# Patient Record
Sex: FEMALE | Race: WHITE | NOT HISPANIC OR LATINO | URBAN - METROPOLITAN AREA
[De-identification: names, ages, dates, MRNs, and addresses within clinical notes are randomized per-mention and may not be internally consistent; named-entity substitution may affect disease eponyms.]

---

## 2017-09-21 ENCOUNTER — OFFICE (AMBULATORY)
Dept: URBAN - METROPOLITAN AREA CLINIC 64 | Facility: CLINIC | Age: 47
End: 2017-09-21
Payer: COMMERCIAL

## 2017-09-21 ENCOUNTER — ON CAMPUS - OUTPATIENT (AMBULATORY)
Dept: URBAN - METROPOLITAN AREA HOSPITAL 2 | Facility: HOSPITAL | Age: 47
End: 2017-09-21

## 2017-09-21 VITALS
DIASTOLIC BLOOD PRESSURE: 69 MMHG | RESPIRATION RATE: 18 BRPM | SYSTOLIC BLOOD PRESSURE: 107 MMHG | OXYGEN SATURATION: 97 % | SYSTOLIC BLOOD PRESSURE: 98 MMHG | SYSTOLIC BLOOD PRESSURE: 94 MMHG | SYSTOLIC BLOOD PRESSURE: 103 MMHG | DIASTOLIC BLOOD PRESSURE: 90 MMHG | HEART RATE: 76 BPM | TEMPERATURE: 98.4 F | HEART RATE: 81 BPM | DIASTOLIC BLOOD PRESSURE: 56 MMHG | HEART RATE: 77 BPM | SYSTOLIC BLOOD PRESSURE: 150 MMHG | SYSTOLIC BLOOD PRESSURE: 110 MMHG | SYSTOLIC BLOOD PRESSURE: 100 MMHG | DIASTOLIC BLOOD PRESSURE: 66 MMHG | HEART RATE: 85 BPM | HEART RATE: 86 BPM | HEART RATE: 84 BPM | OXYGEN SATURATION: 93 % | SYSTOLIC BLOOD PRESSURE: 135 MMHG | RESPIRATION RATE: 16 BRPM | HEIGHT: 67 IN | DIASTOLIC BLOOD PRESSURE: 88 MMHG | DIASTOLIC BLOOD PRESSURE: 46 MMHG | DIASTOLIC BLOOD PRESSURE: 70 MMHG | RESPIRATION RATE: 17 BRPM | OXYGEN SATURATION: 96 % | OXYGEN SATURATION: 94 % | SYSTOLIC BLOOD PRESSURE: 122 MMHG | DIASTOLIC BLOOD PRESSURE: 62 MMHG | DIASTOLIC BLOOD PRESSURE: 49 MMHG | HEART RATE: 87 BPM | WEIGHT: 200 LBS | OXYGEN SATURATION: 92 %

## 2017-09-21 DIAGNOSIS — K62.1 RECTAL POLYP: ICD-10-CM

## 2017-09-21 DIAGNOSIS — K29.60 OTHER GASTRITIS WITHOUT BLEEDING: ICD-10-CM

## 2017-09-21 DIAGNOSIS — D50.9 IRON DEFICIENCY ANEMIA, UNSPECIFIED: ICD-10-CM

## 2017-09-21 DIAGNOSIS — K29.40 CHRONIC ATROPHIC GASTRITIS WITHOUT BLEEDING: ICD-10-CM

## 2017-09-21 DIAGNOSIS — K44.9 DIAPHRAGMATIC HERNIA WITHOUT OBSTRUCTION OR GANGRENE: ICD-10-CM

## 2017-09-21 DIAGNOSIS — K64.0 FIRST DEGREE HEMORRHOIDS: ICD-10-CM

## 2017-09-21 DIAGNOSIS — K31.89 OTHER DISEASES OF STOMACH AND DUODENUM: ICD-10-CM

## 2017-09-21 LAB
GI HISTOLOGY: A. UNSPECIFIED: (no result)
GI HISTOLOGY: B. SELECT: (no result)
GI HISTOLOGY: C. UNSPECIFIED: (no result)
GI HISTOLOGY: D. UNSPECIFIED: (no result)
GI HISTOLOGY: E. UNSPECIFIED: (no result)
GI HISTOLOGY: PDF REPORT: (no result)

## 2017-09-21 PROCEDURE — 88305 TISSUE EXAM BY PATHOLOGIST: CPT | Performed by: INTERNAL MEDICINE

## 2017-09-21 PROCEDURE — 45380 COLONOSCOPY AND BIOPSY: CPT | Performed by: INTERNAL MEDICINE

## 2017-09-21 PROCEDURE — 43239 EGD BIOPSY SINGLE/MULTIPLE: CPT | Performed by: INTERNAL MEDICINE

## 2017-09-21 RX ADMIN — PROPOFOL: 10 INJECTION, EMULSION INTRAVENOUS at 13:14

## 2017-10-06 ENCOUNTER — OFFICE (AMBULATORY)
Dept: URBAN - METROPOLITAN AREA CLINIC 64 | Facility: CLINIC | Age: 47
End: 2017-10-06

## 2017-10-06 VITALS
WEIGHT: 205 LBS | HEART RATE: 88 BPM | DIASTOLIC BLOOD PRESSURE: 80 MMHG | SYSTOLIC BLOOD PRESSURE: 114 MMHG | HEIGHT: 67 IN

## 2017-10-06 DIAGNOSIS — D50.9 IRON DEFICIENCY ANEMIA, UNSPECIFIED: ICD-10-CM

## 2017-10-06 DIAGNOSIS — K59.1 FUNCTIONAL DIARRHEA: ICD-10-CM

## 2017-10-06 DIAGNOSIS — R68.81 EARLY SATIETY: ICD-10-CM

## 2017-10-06 DIAGNOSIS — K30 FUNCTIONAL DYSPEPSIA: ICD-10-CM

## 2017-10-06 PROCEDURE — 99213 OFFICE O/P EST LOW 20 MIN: CPT | Performed by: INTERNAL MEDICINE

## 2017-10-06 RX ORDER — AMOXICILLIN AND CLAVULANATE POTASSIUM 875; 125 MG/1; 1/1
1750 TABLET, FILM COATED ORAL
Qty: 14 | Refills: 0 | Status: COMPLETED
Start: 2017-10-06 | End: 2017-12-01

## 2017-12-01 ENCOUNTER — OFFICE (AMBULATORY)
Dept: URBAN - METROPOLITAN AREA CLINIC 64 | Facility: CLINIC | Age: 47
End: 2017-12-01

## 2017-12-01 VITALS
DIASTOLIC BLOOD PRESSURE: 81 MMHG | SYSTOLIC BLOOD PRESSURE: 150 MMHG | HEART RATE: 79 BPM | HEIGHT: 67 IN | WEIGHT: 206 LBS

## 2017-12-01 DIAGNOSIS — K29.40 CHRONIC ATROPHIC GASTRITIS WITHOUT BLEEDING: ICD-10-CM

## 2017-12-01 DIAGNOSIS — R74.8 ABNORMAL LEVELS OF OTHER SERUM ENZYMES: ICD-10-CM

## 2017-12-01 DIAGNOSIS — K31.84 GASTROPARESIS: ICD-10-CM

## 2017-12-01 DIAGNOSIS — D64.9 ANEMIA, UNSPECIFIED: ICD-10-CM

## 2017-12-01 DIAGNOSIS — K30 FUNCTIONAL DYSPEPSIA: ICD-10-CM

## 2017-12-01 PROCEDURE — 99213 OFFICE O/P EST LOW 20 MIN: CPT | Performed by: INTERNAL MEDICINE

## 2017-12-01 RX ORDER — METOCLOPRAMIDE HYDROCHLORIDE 10 MG/1
30 TABLET ORAL
Qty: 90 | Refills: 0 | Status: ACTIVE
Start: 2017-12-01

## 2017-12-31 ENCOUNTER — HOSPITAL ENCOUNTER (OUTPATIENT)
Dept: URGENT CARE | Facility: CLINIC | Age: 47
Setting detail: SPECIMEN
Discharge: HOME | End: 2017-12-31
Attending: FAMILY MEDICINE | Admitting: FAMILY MEDICINE

## 2017-12-31 LAB
BACTERIA SPEC AEROBE CULT: NORMAL
Lab: NORMAL
MICRO REPORT STATUS: NORMAL
SPECIMEN SOURCE: NORMAL

## 2018-01-16 ENCOUNTER — OFFICE (AMBULATORY)
Dept: URBAN - METROPOLITAN AREA CLINIC 64 | Facility: CLINIC | Age: 48
End: 2018-01-16

## 2018-01-16 VITALS
HEIGHT: 67 IN | HEART RATE: 80 BPM | SYSTOLIC BLOOD PRESSURE: 129 MMHG | DIASTOLIC BLOOD PRESSURE: 79 MMHG | WEIGHT: 201 LBS

## 2018-01-16 DIAGNOSIS — K29.40 CHRONIC ATROPHIC GASTRITIS WITHOUT BLEEDING: ICD-10-CM

## 2018-01-16 DIAGNOSIS — R74.8 ABNORMAL LEVELS OF OTHER SERUM ENZYMES: ICD-10-CM

## 2018-01-16 DIAGNOSIS — K31.84 GASTROPARESIS: ICD-10-CM

## 2018-01-16 PROCEDURE — 99214 OFFICE O/P EST MOD 30 MIN: CPT | Performed by: INTERNAL MEDICINE

## 2018-09-12 ENCOUNTER — OFFICE (AMBULATORY)
Dept: URBAN - METROPOLITAN AREA CLINIC 64 | Facility: CLINIC | Age: 48
End: 2018-09-12

## 2018-09-12 VITALS
SYSTOLIC BLOOD PRESSURE: 117 MMHG | HEART RATE: 84 BPM | DIASTOLIC BLOOD PRESSURE: 66 MMHG | WEIGHT: 201 LBS | HEIGHT: 67 IN

## 2018-09-12 DIAGNOSIS — K76.0 FATTY (CHANGE OF) LIVER, NOT ELSEWHERE CLASSIFIED: ICD-10-CM

## 2018-09-12 DIAGNOSIS — K31.84 GASTROPARESIS: ICD-10-CM

## 2018-09-12 PROCEDURE — 99214 OFFICE O/P EST MOD 30 MIN: CPT | Performed by: INTERNAL MEDICINE

## 2021-01-11 ENCOUNTER — TELEPHONE (OUTPATIENT)
Dept: FAMILY MEDICINE CLINIC | Facility: CLINIC | Age: 51
End: 2021-01-11

## 2021-01-11 ENCOUNTER — LAB (OUTPATIENT)
Dept: LAB | Facility: HOSPITAL | Age: 51
End: 2021-01-11

## 2021-01-11 ENCOUNTER — OFFICE VISIT (OUTPATIENT)
Dept: FAMILY MEDICINE CLINIC | Facility: CLINIC | Age: 51
End: 2021-01-11

## 2021-01-11 VITALS
RESPIRATION RATE: 16 BRPM | OXYGEN SATURATION: 98 % | BODY MASS INDEX: 34.69 KG/M2 | WEIGHT: 221 LBS | SYSTOLIC BLOOD PRESSURE: 138 MMHG | TEMPERATURE: 97.1 F | HEART RATE: 83 BPM | HEIGHT: 67 IN | DIASTOLIC BLOOD PRESSURE: 79 MMHG

## 2021-01-11 DIAGNOSIS — Z00.00 PREVENTATIVE HEALTH CARE: ICD-10-CM

## 2021-01-11 DIAGNOSIS — E53.8 B12 DEFICIENCY: ICD-10-CM

## 2021-01-11 DIAGNOSIS — N25.89 RENAL TUBULAR ACIDOSIS: ICD-10-CM

## 2021-01-11 DIAGNOSIS — L40.9 PSORIASIS: ICD-10-CM

## 2021-01-11 DIAGNOSIS — R22.1 MASS IN NECK: Primary | ICD-10-CM

## 2021-01-11 DIAGNOSIS — E55.9 VITAMIN D DEFICIENCY: ICD-10-CM

## 2021-01-11 DIAGNOSIS — F17.200 TOBACCO USE DISORDER: ICD-10-CM

## 2021-01-11 PROBLEM — D75.839 THROMBOCYTOSIS: Status: ACTIVE | Noted: 2017-06-21

## 2021-01-11 LAB
ALBUMIN SERPL-MCNC: 3.9 G/DL (ref 3.5–5.2)
ALBUMIN/GLOB SERPL: 1.3 G/DL
ALP SERPL-CCNC: 128 U/L (ref 39–117)
ALT SERPL W P-5'-P-CCNC: 35 U/L (ref 1–33)
ANION GAP SERPL CALCULATED.3IONS-SCNC: 8.6 MMOL/L (ref 5–15)
AST SERPL-CCNC: 26 U/L (ref 1–32)
BACTERIA UR QL AUTO: ABNORMAL /HPF
BASOPHILS # BLD AUTO: 0.08 10*3/MM3 (ref 0–0.2)
BASOPHILS NFR BLD AUTO: 0.6 % (ref 0–1.5)
BILIRUB SERPL-MCNC: 0.3 MG/DL (ref 0–1.2)
BILIRUB UR QL STRIP: NEGATIVE
BUN SERPL-MCNC: 11 MG/DL (ref 6–20)
BUN/CREAT SERPL: 15.5 (ref 7–25)
CALCIUM SPEC-SCNC: 9.1 MG/DL (ref 8.6–10.5)
CHLORIDE SERPL-SCNC: 103 MMOL/L (ref 98–107)
CHOLEST SERPL-MCNC: 169 MG/DL (ref 0–200)
CLARITY UR: CLEAR
CO2 SERPL-SCNC: 25.4 MMOL/L (ref 22–29)
COLOR UR: YELLOW
CREAT SERPL-MCNC: 0.71 MG/DL (ref 0.57–1)
DEPRECATED RDW RBC AUTO: 42.2 FL (ref 37–54)
EOSINOPHIL # BLD AUTO: 0.24 10*3/MM3 (ref 0–0.4)
EOSINOPHIL NFR BLD AUTO: 1.8 % (ref 0.3–6.2)
ERYTHROCYTE [DISTWIDTH] IN BLOOD BY AUTOMATED COUNT: 13.5 % (ref 12.3–15.4)
GFR SERPL CREATININE-BSD FRML MDRD: 87 ML/MIN/1.73
GLOBULIN UR ELPH-MCNC: 3 GM/DL
GLUCOSE SERPL-MCNC: 106 MG/DL (ref 65–99)
GLUCOSE UR STRIP-MCNC: NEGATIVE MG/DL
HCT VFR BLD AUTO: 48.8 % (ref 34–46.6)
HCV AB SER DONR QL: NORMAL
HDLC SERPL-MCNC: 39 MG/DL (ref 40–60)
HGB BLD-MCNC: 15.6 G/DL (ref 12–15.9)
HGB UR QL STRIP.AUTO: ABNORMAL
HYALINE CASTS UR QL AUTO: ABNORMAL /LPF
IMM GRANULOCYTES # BLD AUTO: 0.08 10*3/MM3 (ref 0–0.05)
IMM GRANULOCYTES NFR BLD AUTO: 0.6 % (ref 0–0.5)
KETONES UR QL STRIP: NEGATIVE
LDLC SERPL CALC-MCNC: 105 MG/DL (ref 0–100)
LDLC/HDLC SERPL: 2.61 {RATIO}
LEUKOCYTE ESTERASE UR QL STRIP.AUTO: ABNORMAL
LYMPHOCYTES # BLD AUTO: 3.01 10*3/MM3 (ref 0.7–3.1)
LYMPHOCYTES NFR BLD AUTO: 22.6 % (ref 19.6–45.3)
MCH RBC QN AUTO: 27.7 PG (ref 26.6–33)
MCHC RBC AUTO-ENTMCNC: 32 G/DL (ref 31.5–35.7)
MCV RBC AUTO: 86.7 FL (ref 79–97)
MONOCYTES # BLD AUTO: 0.95 10*3/MM3 (ref 0.1–0.9)
MONOCYTES NFR BLD AUTO: 7.1 % (ref 5–12)
NEUTROPHILS NFR BLD AUTO: 67.3 % (ref 42.7–76)
NEUTROPHILS NFR BLD AUTO: 8.97 10*3/MM3 (ref 1.7–7)
NITRITE UR QL STRIP: NEGATIVE
NRBC BLD AUTO-RTO: 0 /100 WBC (ref 0–0.2)
PH UR STRIP.AUTO: 7 [PH] (ref 5–8)
PLATELET # BLD AUTO: 378 10*3/MM3 (ref 140–450)
PMV BLD AUTO: 10.1 FL (ref 6–12)
POTASSIUM SERPL-SCNC: 3.6 MMOL/L (ref 3.5–5.2)
PROT SERPL-MCNC: 6.9 G/DL (ref 6–8.5)
PROT UR QL STRIP: ABNORMAL
RBC # BLD AUTO: 5.63 10*6/MM3 (ref 3.77–5.28)
RBC # UR: ABNORMAL /HPF
REF LAB TEST METHOD: ABNORMAL
SODIUM SERPL-SCNC: 137 MMOL/L (ref 136–145)
SP GR UR STRIP: 1.01 (ref 1–1.03)
SQUAMOUS #/AREA URNS HPF: ABNORMAL /HPF
TRIGL SERPL-MCNC: 141 MG/DL (ref 0–150)
TSH SERPL DL<=0.05 MIU/L-ACNC: 1.43 UIU/ML (ref 0.27–4.2)
UROBILINOGEN UR QL STRIP: ABNORMAL
VLDLC SERPL-MCNC: 25 MG/DL (ref 5–40)
WBC # BLD AUTO: 13.33 10*3/MM3 (ref 3.4–10.8)
WBC UR QL AUTO: ABNORMAL /HPF

## 2021-01-11 PROCEDURE — 81001 URINALYSIS AUTO W/SCOPE: CPT | Performed by: FAMILY MEDICINE

## 2021-01-11 PROCEDURE — 87086 URINE CULTURE/COLONY COUNT: CPT | Performed by: FAMILY MEDICINE

## 2021-01-11 PROCEDURE — 99204 OFFICE O/P NEW MOD 45 MIN: CPT | Performed by: FAMILY MEDICINE

## 2021-01-11 PROCEDURE — 80061 LIPID PANEL: CPT | Performed by: FAMILY MEDICINE

## 2021-01-11 PROCEDURE — 96372 THER/PROPH/DIAG INJ SC/IM: CPT | Performed by: FAMILY MEDICINE

## 2021-01-11 PROCEDURE — 85025 COMPLETE CBC W/AUTO DIFF WBC: CPT | Performed by: FAMILY MEDICINE

## 2021-01-11 PROCEDURE — 80053 COMPREHEN METABOLIC PANEL: CPT | Performed by: FAMILY MEDICINE

## 2021-01-11 PROCEDURE — 86803 HEPATITIS C AB TEST: CPT | Performed by: FAMILY MEDICINE

## 2021-01-11 PROCEDURE — 36415 COLL VENOUS BLD VENIPUNCTURE: CPT | Performed by: FAMILY MEDICINE

## 2021-01-11 PROCEDURE — 84443 ASSAY THYROID STIM HORMONE: CPT | Performed by: FAMILY MEDICINE

## 2021-01-11 RX ORDER — SPIRONOLACTONE 25 MG/1
25 TABLET ORAL DAILY
Qty: 90 TABLET | Refills: 1 | Status: SHIPPED | OUTPATIENT
Start: 2021-01-11

## 2021-01-11 RX ORDER — ALBUTEROL SULFATE 90 UG/1
AEROSOL, METERED RESPIRATORY (INHALATION)
COMMUNITY
Start: 2017-07-02 | End: 2022-03-11

## 2021-01-11 RX ORDER — POTASSIUM CHLORIDE 20 MEQ/1
TABLET, EXTENDED RELEASE ORAL
COMMUNITY
Start: 2012-12-03

## 2021-01-11 RX ORDER — SPIRONOLACTONE 25 MG/1
TABLET ORAL
COMMUNITY
Start: 2017-07-02 | End: 2021-01-11 | Stop reason: SDUPTHER

## 2021-01-11 RX ORDER — CYANOCOBALAMIN 1000 UG/ML
1000 INJECTION, SOLUTION INTRAMUSCULAR; SUBCUTANEOUS
Status: DISCONTINUED | OUTPATIENT
Start: 2021-01-11 | End: 2021-04-19

## 2021-01-11 RX ORDER — BUPROPION HYDROCHLORIDE 150 MG/1
TABLET ORAL
COMMUNITY
Start: 2017-07-02 | End: 2021-01-11

## 2021-01-11 RX ORDER — CLOBETASOL PROPIONATE 0.5 MG/G
OINTMENT TOPICAL
COMMUNITY
Start: 2020-10-23

## 2021-01-11 RX ADMIN — CYANOCOBALAMIN 1000 MCG: 1000 INJECTION, SOLUTION INTRAMUSCULAR; SUBCUTANEOUS at 11:58

## 2021-01-12 LAB — BACTERIA SPEC AEROBE CULT: NO GROWTH

## 2021-01-12 NOTE — TELEPHONE ENCOUNTER
Xena, please call [facility] for her most recent mammogram and call [facility] for her most recent colonoscopy. Thank you.

## 2021-01-12 NOTE — PROGRESS NOTES
Subjective   Chief Complaint   Patient presents with   • get established   • Discussed medical problems and medications   • Lab review and lab order   • Lump on the neck     Claire Moran is a 50 y.o. female.     Patient Care Team:  Ophelia Benson MD as PCP - General (Family Medicine)  Quinn Allen MD as Consulting Physician (Nephrology)    She is coming in today to get established and she also wants to review her medical problems and her medications and also address a new problem.  She is currently being followed and treated by nephrologist due to renal tubular acidosis, which she was diagnosed with as a young adult.  She needs some refills today from me.  She is currently on spironolactone.  She is also being treated for psoriasis and she use it clobetasol as needed.  She mainly has outbreaks on her elbows.  She is a smoker, however she has tried to quit in the past few times even with the help of Wellbutrin and she was not successful much.  She tells me that she has persistently slightly elevated white count and this was evaluated in the past.  She also wants to talk about the lump, which she noted on the right side of her neck about a month ago.  At the beginning it seemed to be fluctuated in size, but lately it seems to be getting bigger.  She denies any discomfort and she denies any other lumps.  She denies any cough, congestion, difficulty breathing, night sweats, or any skin issues.       The following portions of the patient's history were reviewed and updated as appropriate: allergies, current medications, past family history, past medical history, past social history, past surgical history and problem list.  Past Medical History:   Diagnosis Date   • Allergic rhinitis    • Anemia    • Kidney stone    • Psoriasis    • RTA (renal tubular acidosis)      Past Surgical History:   Procedure Laterality Date   • DILATATION AND CURETTAGE     • FOOT SURGERY       The patient has a family history of  Family  "History   Adopted: Yes   Family history unknown: Yes     Social History     Socioeconomic History   • Marital status:      Spouse name: Not on file   • Number of children: Not on file   • Years of education: Not on file   • Highest education level: Not on file   Tobacco Use   • Smoking status: Current Every Day Smoker     Packs/day: 0.50   • Smokeless tobacco: Never Used   Substance and Sexual Activity   • Alcohol use: Yes   • Drug use: Never   • Sexual activity: Defer       Review of Systems   Constitutional: Negative for activity change, fatigue and fever.   Respiratory: Negative for cough, shortness of breath and wheezing.    Cardiovascular: Negative for chest pain, palpitations and leg swelling.   Gastrointestinal: Negative for constipation, diarrhea and indigestion.   Skin: Negative for color change, dry skin and rash.   Neurological: Negative for tremors and headache.   Hematological: Positive for adenopathy.     Visit Vitals  /79 (BP Location: Left arm, Patient Position: Sitting, Cuff Size: Large Adult)   Pulse 83   Temp 97.1 °F (36.2 °C) (Temporal)   Resp 16   Ht 170.2 cm (67\")   Wt 100 kg (221 lb)   SpO2 98%   BMI 34.61 kg/m²       Current Outpatient Medications:   •  albuterol sulfate HFA (ProAir HFA) 108 (90 Base) MCG/ACT inhaler, PROAIR  (90 Base) MCG/ACT AERS, Disp: , Rfl:   •  potassium chloride (KLOR-CON) 20 MEQ CR tablet, KLOR-CON M20 20 MEQ CR-TABS, Disp: , Rfl:   •  spironolactone (ALDACTONE) 25 MG tablet, Take 1 tablet by mouth Daily., Disp: 90 tablet, Rfl: 1  •  clobetasol (TEMOVATE) 0.05 % ointment, , Disp: , Rfl:     Current Facility-Administered Medications:   •  cyanocobalamin injection 1,000 mcg, 1,000 mcg, Intramuscular, Q28 Days, Ophelia Benson MD, 1,000 mcg at 01/11/21 1158    Objective   Physical Exam  Vitals signs and nursing note reviewed.   Constitutional:       Appearance: Normal appearance. She is well-developed.   HENT:      Head: Normocephalic and " atraumatic.   Neck:      Comments: There is a lump palpated on the right side of the neck just below the jaw, it is about 3 cm in diameter, is not very mobile, no palpation tenderness.  No skin associated changes.  Cardiovascular:      Rate and Rhythm: Normal rate and regular rhythm.      Heart sounds: Normal heart sounds. No murmur. No gallop.    Pulmonary:      Effort: Pulmonary effort is normal. No respiratory distress.      Breath sounds: Normal breath sounds. No wheezing, rhonchi or rales.   Chest:      Chest wall: No tenderness.   Neurological:      General: No focal deficit present.      Mental Status: She is alert and oriented to person, place, and time. Mental status is at baseline.              Office Visit on 01/11/2021   Component Date Value Ref Range Status   • WBC 01/11/2021 13.33* 3.40 - 10.80 10*3/mm3 Final   • RBC 01/11/2021 5.63* 3.77 - 5.28 10*6/mm3 Final   • Hemoglobin 01/11/2021 15.6  12.0 - 15.9 g/dL Final   • Hematocrit 01/11/2021 48.8* 34.0 - 46.6 % Final   • MCV 01/11/2021 86.7  79.0 - 97.0 fL Final   • MCH 01/11/2021 27.7  26.6 - 33.0 pg Final   • MCHC 01/11/2021 32.0  31.5 - 35.7 g/dL Final   • RDW 01/11/2021 13.5  12.3 - 15.4 % Final   • RDW-SD 01/11/2021 42.2  37.0 - 54.0 fl Final   • MPV 01/11/2021 10.1  6.0 - 12.0 fL Final   • Platelets 01/11/2021 378  140 - 450 10*3/mm3 Final   • Neutrophil % 01/11/2021 67.3  42.7 - 76.0 % Final   • Lymphocyte % 01/11/2021 22.6  19.6 - 45.3 % Final   • Monocyte % 01/11/2021 7.1  5.0 - 12.0 % Final   • Eosinophil % 01/11/2021 1.8  0.3 - 6.2 % Final   • Basophil % 01/11/2021 0.6  0.0 - 1.5 % Final   • Immature Grans % 01/11/2021 0.6* 0.0 - 0.5 % Final   • Neutrophils, Absolute 01/11/2021 8.97* 1.70 - 7.00 10*3/mm3 Final   • Lymphocytes, Absolute 01/11/2021 3.01  0.70 - 3.10 10*3/mm3 Final   • Monocytes, Absolute 01/11/2021 0.95* 0.10 - 0.90 10*3/mm3 Final   • Eosinophils, Absolute 01/11/2021 0.24  0.00 - 0.40 10*3/mm3 Final   • Basophils, Absolute  01/11/2021 0.08  0.00 - 0.20 10*3/mm3 Final   • Immature Grans, Absolute 01/11/2021 0.08* 0.00 - 0.05 10*3/mm3 Final   • nRBC 01/11/2021 0.0  0.0 - 0.2 /100 WBC Final   • Glucose 01/11/2021 106* 65 - 99 mg/dL Final   • BUN 01/11/2021 11  6 - 20 mg/dL Final   • Creatinine 01/11/2021 0.71  0.57 - 1.00 mg/dL Final   • Sodium 01/11/2021 137  136 - 145 mmol/L Final   • Potassium 01/11/2021 3.6  3.5 - 5.2 mmol/L Final   • Chloride 01/11/2021 103  98 - 107 mmol/L Final   • CO2 01/11/2021 25.4  22.0 - 29.0 mmol/L Final   • Calcium 01/11/2021 9.1  8.6 - 10.5 mg/dL Final   • Total Protein 01/11/2021 6.9  6.0 - 8.5 g/dL Final   • Albumin 01/11/2021 3.90  3.50 - 5.20 g/dL Final   • ALT (SGPT) 01/11/2021 35* 1 - 33 U/L Final   • AST (SGOT) 01/11/2021 26  1 - 32 U/L Final   • Alkaline Phosphatase 01/11/2021 128* 39 - 117 U/L Final   • Total Bilirubin 01/11/2021 0.3  0.0 - 1.2 mg/dL Final   • eGFR Non  Amer 01/11/2021 87  >60 mL/min/1.73 Final   • Globulin 01/11/2021 3.0  gm/dL Final   • A/G Ratio 01/11/2021 1.3  g/dL Final   • BUN/Creatinine Ratio 01/11/2021 15.5  7.0 - 25.0 Final   • Anion Gap 01/11/2021 8.6  5.0 - 15.0 mmol/L Final   • Total Cholesterol 01/11/2021 169  0 - 200 mg/dL Final   • Triglycerides 01/11/2021 141  0 - 150 mg/dL Final   • HDL Cholesterol 01/11/2021 39* 40 - 60 mg/dL Final   • LDL Cholesterol  01/11/2021 105* 0 - 100 mg/dL Final   • VLDL Cholesterol 01/11/2021 25  5 - 40 mg/dL Final   • LDL/HDL Ratio 01/11/2021 2.61   Final   • TSH 01/11/2021 1.430  0.270 - 4.200 uIU/mL Final   • Color, UA 01/11/2021 Yellow  Yellow, Straw Final   • Appearance, UA 01/11/2021 Clear  Clear Final   • pH, UA 01/11/2021 7.0  5.0 - 8.0 Final   • Specific Gravity, UA 01/11/2021 1.012  1.005 - 1.030 Final   • Glucose, UA 01/11/2021 Negative  Negative Final   • Ketones, UA 01/11/2021 Negative  Negative Final   • Bilirubin, UA 01/11/2021 Negative  Negative Final   • Blood, UA 01/11/2021 Small (1+)* Negative Final   • Protein,  UA 01/11/2021 Trace* Negative Final   • Leuk Esterase, UA 01/11/2021 Small (1+)* Negative Final   • Nitrite, UA 01/11/2021 Negative  Negative Final   • Urobilinogen, UA 01/11/2021 0.2 E.U./dL  0.2 - 1.0 E.U./dL Final   • Hepatitis C Ab 01/11/2021 Non-Reactive  Non-Reactive Final   • RBC, UA 01/11/2021 31-50* None Seen, 0-2 /HPF Final   • WBC, UA 01/11/2021 6-12* None Seen, 0-2 /HPF Final   • Bacteria, UA 01/11/2021 1+* None Seen /HPF Final   • Squamous Epithelial Cells, UA 01/11/2021 3-6* None Seen, 0-2 /HPF Final   • Hyaline Casts, UA 01/11/2021 0-2  None Seen /LPF Final   • Methodology 01/11/2021 Automated Microscopy   Final       FOLLOWING LABS WERE REVIEWED TODAY:  CMP    CMP 1/11/21   BUN 11   Creatinine 0.71   eGFR Non African Am 87   Sodium 137   Potassium 3.6   Chloride 103   Calcium 9.1   Albumin 3.90   Total Bilirubin 0.3   Alkaline Phosphatase 128 (A)   AST (SGOT) 26   ALT (SGPT) 35 (A)   (A) Abnormal value            CBC    CBC 1/11/21   WBC 13.33 (A)   RBC 5.63 (A)   Hemoglobin 15.6   Hematocrit 48.8 (A)   MCV 86.7   MCH 27.7   MCHC 32.0   RDW 13.5   Platelets 378   (A) Abnormal value                  Lab Results   Component Value Date    BUN 11 01/11/2021    CREATININE 0.71 01/11/2021    EGFRIFNONA 87 01/11/2021     01/11/2021    K 3.6 01/11/2021     01/11/2021    CALCIUM 9.1 01/11/2021    ALBUMIN 3.90 01/11/2021    BILITOT 0.3 01/11/2021    ALKPHOS 128 (H) 01/11/2021    AST 26 01/11/2021    ALT 35 (H) 01/11/2021    TRIG 141 01/11/2021    HDL 39 (L) 01/11/2021    VLDL 25 01/11/2021     (H) 01/11/2021    LDLHDL 2.61 01/11/2021    WBC 13.33 (H) 01/11/2021    RBC 5.63 (H) 01/11/2021    HCT 48.8 (H) 01/11/2021    MCV 86.7 01/11/2021    MCH 27.7 01/11/2021    TSH 1.430 01/11/2021          Assessment/Plan   Diagnoses and all orders for this visit:    1. Mass in neck (Primary)  -     US Head Neck Soft Tissue; Future    2. Renal tubular acidosis    3. Preventative health care  -     CBC Auto  Differential  -     Comprehensive Metabolic Panel  -     Lipid Panel  -     TSH  -     Urinalysis With Culture If Indicated - Urine, Clean Catch  -     Hepatitis C Antibody  -     Urinalysis, Microscopic Only - Urine, Clean Catch  -     Urine Culture - Urine, Urine, Clean Catch    4. B12 deficiency  -     cyanocobalamin injection 1,000 mcg    5. Vitamin D deficiency    6. Psoriasis    7. Tobacco use disorder    Other orders  -     spironolactone (ALDACTONE) 25 MG tablet; Take 1 tablet by mouth Daily.  Dispense: 90 tablet; Refill: 1    I reviewed her medical problems and her medications.  I also reviewed her available lab results including CBC and CMP.  Her white count was elevated at around 13 and she tells me that this is a chronic finding which she was evaluated for in the past.  I refilled her spironolactone and she was given vitamin B12 shots due to known vitamin B12 deficiency.  She will also continue vitamin D supplementation.  She may continue clobetasol for psoriasis.  Smoking cessation was also discussed.  If it comes to a lump on the right side of the neck this will need certainly further evaluation including blood work and CBC.  I will be getting ultrasound of that area.  I explained to her that she might need further testing like CAT scan, however further recommendations will follow once I have the results of the ultrasound.  All questions were answered today to my best knowledge and her satisfaction.  I will also be getting the copy of her most recent mammogram from June 2020 and her colonoscopy report from few years ago.               Return in about 6 months (around 7/11/2021) for Annual physical.    Requested Prescriptions     Signed Prescriptions Disp Refills   • spironolactone (ALDACTONE) 25 MG tablet 90 tablet 1     Sig: Take 1 tablet by mouth Daily.

## 2021-01-19 DIAGNOSIS — R22.1 MASS IN NECK: Primary | ICD-10-CM

## 2021-01-29 ENCOUNTER — OFFICE VISIT (OUTPATIENT)
Dept: FAMILY MEDICINE CLINIC | Facility: CLINIC | Age: 51
End: 2021-01-29

## 2021-01-29 VITALS
SYSTOLIC BLOOD PRESSURE: 139 MMHG | RESPIRATION RATE: 16 BRPM | HEART RATE: 97 BPM | DIASTOLIC BLOOD PRESSURE: 84 MMHG | WEIGHT: 221 LBS | BODY MASS INDEX: 34.69 KG/M2 | OXYGEN SATURATION: 96 % | TEMPERATURE: 97.1 F | HEIGHT: 67 IN

## 2021-01-29 DIAGNOSIS — D72.829 LEUKOCYTOSIS, UNSPECIFIED TYPE: ICD-10-CM

## 2021-01-29 DIAGNOSIS — F17.200 TOBACCO USE DISORDER: ICD-10-CM

## 2021-01-29 DIAGNOSIS — R59.0 LYMPHADENOPATHY OF RIGHT CERVICAL REGION: Primary | ICD-10-CM

## 2021-01-29 PROCEDURE — 99214 OFFICE O/P EST MOD 30 MIN: CPT | Performed by: FAMILY MEDICINE

## 2021-01-30 PROBLEM — D72.829 LEUKOCYTOSIS: Status: ACTIVE | Noted: 2021-01-30

## 2021-01-30 NOTE — PROGRESS NOTES
Subjective   Chief Complaint   Patient presents with   • Discuss Results   • Cervical lymphadenopathy     Claire Moran is a 50 y.o. female.     Patient Care Team:  Ophelia Benson MD as PCP - General (Family Medicine)  Quinn Allen MD as Consulting Physician (Nephrology)    She is coming in today to follow-up on her neck mass and discuss her recent testing.  She for the first time noticed a lump on the right side of her neck about 6 weeks ago.  She was evaluated here couple weeks ago and had blood work as well as the ultrasound done.  Ultrasound looks suspicious for a necrotic lymph node in CT of the neck was ordered in this is suspicious for possible metastasis.  She denies any other lumps or any other symptoms including night sweats, fatigue, weight loss, vomiting, abdominal pain,.  She is up to speed with her mammogram.  Her recent labs showed slightly elevated white count, however this is not new and she has had per her history elevated white count for quite some time.       The following portions of the patient's history were reviewed and updated as appropriate: allergies, current medications, past family history, past medical history, past social history, past surgical history and problem list.  Past Medical History:   Diagnosis Date   • Allergic rhinitis    • Anemia    • Kidney stone    • Psoriasis    • RTA (renal tubular acidosis)      Past Surgical History:   Procedure Laterality Date   • DILATATION AND CURETTAGE     • FOOT SURGERY       The patient has a family history of  Family History   Adopted: Yes   Family history unknown: Yes     Social History     Socioeconomic History   • Marital status:      Spouse name: Not on file   • Number of children: Not on file   • Years of education: Not on file   • Highest education level: Not on file   Tobacco Use   • Smoking status: Current Every Day Smoker     Packs/day: 0.50   • Smokeless tobacco: Never Used   Substance and Sexual Activity   • Alcohol use:  "Yes   • Drug use: Never   • Sexual activity: Defer       Review of Systems   Constitutional: Negative for activity change, fatigue and fever.   Respiratory: Negative for cough, shortness of breath and wheezing.    Cardiovascular: Negative for chest pain, palpitations and leg swelling.   Gastrointestinal: Negative for constipation, diarrhea and indigestion.   Skin: Negative for color change, dry skin and rash.   Neurological: Negative for tremors and headache.   Hematological: Positive for adenopathy.     Visit Vitals  /84 (BP Location: Left arm, Patient Position: Sitting, Cuff Size: Large Adult)   Pulse 97   Temp 97.1 °F (36.2 °C) (Temporal)   Resp 16   Ht 170.2 cm (67\")   Wt 100 kg (221 lb)   SpO2 96%   BMI 34.61 kg/m²       Current Outpatient Medications:   •  albuterol sulfate HFA (ProAir HFA) 108 (90 Base) MCG/ACT inhaler, PROAIR  (90 Base) MCG/ACT AERS, Disp: , Rfl:   •  clobetasol (TEMOVATE) 0.05 % ointment, , Disp: , Rfl:   •  potassium chloride (KLOR-CON) 20 MEQ CR tablet, KLOR-CON M20 20 MEQ CR-TABS, Disp: , Rfl:   •  spironolactone (ALDACTONE) 25 MG tablet, Take 1 tablet by mouth Daily., Disp: 90 tablet, Rfl: 1    Current Facility-Administered Medications:   •  cyanocobalamin injection 1,000 mcg, 1,000 mcg, Intramuscular, Q28 Days, Ophelia Benson MD, 1,000 mcg at 01/11/21 1158    Objective   Physical Exam  Constitutional:       General: She is not in acute distress.     Appearance: Normal appearance. She is well-developed. She is not ill-appearing or diaphoretic.      Comments: Patient is in no distress, patient has normal voice and speech.  Normal respiratory effort.   HENT:      Head: Normocephalic and atraumatic.   Neck:      Musculoskeletal: Normal range of motion and neck supple.      Comments: There is a mass noted on the right side of the neck just below the mandibular, it is about 3 cm in diameter, not mobile and nontender.  No skin associated changes.  Pulmonary:      Effort: " Pulmonary effort is normal.   Lymphadenopathy:      Cervical: Cervical adenopathy present.   Neurological:      General: No focal deficit present.      Mental Status: She is alert and oriented to person, place, and time. Mental status is at baseline.   Psychiatric:         Mood and Affect: Mood normal.       FOLLOWING LABS WERE REVIEWED TODAY:  CBC    CBC 1/11/21   WBC 13.33 (A)   RBC 5.63 (A)   Hemoglobin 15.6   Hematocrit 48.8 (A)   MCV 86.7   MCH 27.7   MCHC 32.0   RDW 13.5   Platelets 378   (A) Abnormal value            CMP    CMP 1/11/21   BUN 11   Creatinine 0.71   eGFR Non African Am 87   Sodium 137   Potassium 3.6   Chloride 103   Calcium 9.1   Albumin 3.90   Total Bilirubin 0.3   Alkaline Phosphatase 128 (A)   AST (SGOT) 26   ALT (SGPT) 35 (A)   (A) Abnormal value            TSH    TSH 6/17/20 1/11/21   TSH 1.730 1.430      Comments are available for some flowsheets but are not being displayed.             Assessment/Plan   Diagnoses and all orders for this visit:    1. Lymphadenopathy of right cervical region (Primary)  -     CT Chest Without Contrast; Future  -     CT Abdomen Pelvis With Contrast; Future  -     Ambulatory Referral to Hematology    2. Leukocytosis, unspecified type    3. Tobacco use disorder      I reviewed her recent labs as well as the ultrasound of the neck and CT of the neck.  The ultrasound of the neck and CT of the [facility] done at [facility] and these reports were scanned separately in the media section of patient's chart.  It shows a suspicious mass in the right upper part of the neck which is about 2.5 centimeters in diameter and show some necrotic features highly suspicious for metastatic disease.  She does not have any systemic symptoms and she is up to speed with her mammogram.  I will ordering CT of the chest and abdomen with contrast to look for any further lymphadenopathy and possible primary.  She will need a tissue biopsy as well and that can be either done  through the core biopsy or possibly lymph node removal.  I will be referring her to see a hematologist/oncologist, Dr. Benjamin.  In fact I contacted Dr. Benjamin today personally to discuss the case with him and he agrees with proceeding with the CTs and he will further guide the work-up and management of this patient.  I tried to answer all my questions today to my best knowledge and her satisfaction.  I do understand that information given today creates a lot of anxiety and stress and I was trying to be as informative and sensitive as possible today.        Return if symptoms worsen or fail to improve, for Next scheduled follow up.    Requested Prescriptions      No prescriptions requested or ordered in this encounter

## 2021-02-01 ENCOUNTER — TELEPHONE (OUTPATIENT)
Dept: ONCOLOGY | Facility: CLINIC | Age: 51
End: 2021-02-01

## 2021-02-02 ENCOUNTER — TELEPHONE (OUTPATIENT)
Dept: FAMILY MEDICINE CLINIC | Facility: CLINIC | Age: 51
End: 2021-02-02

## 2021-02-02 RX ORDER — SULFAMETHOXAZOLE AND TRIMETHOPRIM 800; 160 MG/1; MG/1
1 TABLET ORAL 2 TIMES DAILY
Qty: 10 TABLET | Refills: 0 | Status: SHIPPED | OUTPATIENT
Start: 2021-02-02 | End: 2021-02-07

## 2021-02-02 NOTE — TELEPHONE ENCOUNTER
Caller: Claire Moran    Relationship: Self    Best call back number: [phone]      PT STATED THAT SHE BELIEVES HER UTI IS RETURNING AS SHE IS HAVING FREQUENT URINATION WITH NO RELIEF OF OVER THE COUNTER.    Have you had these symptoms before:    [x] Yes  [] No    If a prescription is needed, what is your preferred pharmacy and phone number: [facility] [address] [phone] [facility] [phone]

## 2021-02-08 ENCOUNTER — TELEPHONE (OUTPATIENT)
Dept: ONCOLOGY | Facility: CLINIC | Age: 51
End: 2021-02-08

## 2021-02-08 ENCOUNTER — CONSULT (OUTPATIENT)
Dept: ONCOLOGY | Facility: CLINIC | Age: 51
End: 2021-02-08

## 2021-02-08 ENCOUNTER — LAB (OUTPATIENT)
Dept: LAB | Facility: HOSPITAL | Age: 51
End: 2021-02-08

## 2021-02-08 VITALS — BODY MASS INDEX: 35.03 KG/M2 | HEIGHT: 67 IN | WEIGHT: 223.2 LBS | TEMPERATURE: 97.9 F | RESPIRATION RATE: 18 BRPM

## 2021-02-08 DIAGNOSIS — R59.0 LYMPHADENOPATHY OF RIGHT CERVICAL REGION: ICD-10-CM

## 2021-02-08 DIAGNOSIS — R59.0 LYMPHADENOPATHY OF RIGHT CERVICAL REGION: Primary | ICD-10-CM

## 2021-02-08 LAB
BASOPHILS # BLD AUTO: 0.07 10*3/MM3 (ref 0–0.2)
BASOPHILS NFR BLD AUTO: 0.5 % (ref 0–1.5)
CORTIS SERPL-MCNC: 5.52 MCG/DL
CRP SERPL-MCNC: 1.19 MG/DL (ref 0–0.5)
DEPRECATED RDW RBC AUTO: 48.5 FL (ref 37–54)
EOSINOPHIL # BLD AUTO: 0.26 10*3/MM3 (ref 0–0.4)
EOSINOPHIL NFR BLD AUTO: 1.8 % (ref 0.3–6.2)
ERYTHROCYTE [DISTWIDTH] IN BLOOD BY AUTOMATED COUNT: 15.4 % (ref 12.3–15.4)
ERYTHROCYTE [SEDIMENTATION RATE] IN BLOOD: 25 MM/HR (ref 0–20)
HCT VFR BLD AUTO: 48.7 % (ref 34–46.6)
HGB BLD-MCNC: 15.5 G/DL (ref 12–15.9)
LDH SERPL-CCNC: 139 U/L (ref 135–214)
LYMPHOCYTES # BLD AUTO: 3.25 10*3/MM3 (ref 0.7–3.1)
LYMPHOCYTES NFR BLD AUTO: 22.3 % (ref 19.6–45.3)
MCH RBC QN AUTO: 27.6 PG (ref 26.6–33)
MCHC RBC AUTO-ENTMCNC: 31.8 G/DL (ref 31.5–35.7)
MCV RBC AUTO: 86.7 FL (ref 79–97)
MONOCYTES # BLD AUTO: 0.87 10*3/MM3 (ref 0.1–0.9)
MONOCYTES NFR BLD AUTO: 6 % (ref 5–12)
NEUTROPHILS NFR BLD AUTO: 10.12 10*3/MM3 (ref 1.7–7)
NEUTROPHILS NFR BLD AUTO: 69.4 % (ref 42.7–76)
PLATELET # BLD AUTO: 232 10*3/MM3 (ref 140–450)
PMV BLD AUTO: 10.8 FL (ref 6–12)
RBC # BLD AUTO: 5.62 10*6/MM3 (ref 3.77–5.28)
WBC # BLD AUTO: 14.57 10*3/MM3 (ref 3.4–10.8)

## 2021-02-08 PROCEDURE — 85651 RBC SED RATE NONAUTOMATED: CPT

## 2021-02-08 PROCEDURE — 86140 C-REACTIVE PROTEIN: CPT | Performed by: INTERNAL MEDICINE

## 2021-02-08 PROCEDURE — 99204 OFFICE O/P NEW MOD 45 MIN: CPT | Performed by: INTERNAL MEDICINE

## 2021-02-08 PROCEDURE — 86038 ANTINUCLEAR ANTIBODIES: CPT | Performed by: INTERNAL MEDICINE

## 2021-02-08 PROCEDURE — 36415 COLL VENOUS BLD VENIPUNCTURE: CPT | Performed by: INTERNAL MEDICINE

## 2021-02-08 PROCEDURE — 83615 LACTATE (LD) (LDH) ENZYME: CPT | Performed by: INTERNAL MEDICINE

## 2021-02-08 PROCEDURE — 85025 COMPLETE CBC W/AUTO DIFF WBC: CPT

## 2021-02-08 PROCEDURE — 82533 TOTAL CORTISOL: CPT | Performed by: INTERNAL MEDICINE

## 2021-02-08 NOTE — PROGRESS NOTES
HEMATOLOGY ONCOLOGY OUTPATIENT CONSULTATION       Patient name: Claire Moran  : 1970  MRN: [id number]  Primary Care Physician: Ophelia Benson MD  Referring Physician: Ophelia Benson MD  Reason For Consult:     Chief Complaint   Patient presents with   • Appointment     lymphadenopathy of right cerical region    • Follow-up         HPI:   History of Present Illness:  Claire Moran is 50 y.o. female who presented to our office on 21 for consultation regarding right neck lymphadenopathy.  Patient saw her PCP Dr Benson on 2021 for the first time and reported symptoms of lump on her right neck.  Patient originally noticed the lump in December.  Patient has a history of tobacco use.  Ultrasound of the neck was ordered and following that a CT of the neck with contrast was done.  There was about a 3.6 cm right neck mass.     · 2021 TSH 1.43, hepatitis C antibody negative, creatinine 0.7, ALT 35 high, alk phos 128 high, WBC 13.3, hemoglobin 15.6, platelets 378, MCV 86,  · 2021: Ultrasound of the neck: Large cystic mass in the right neck is likely due to necrotic or cystic lymph node.  Measures 3.6 x 2.2 x 3.7 cm.  · 2021: CT neck with contrast: 2.5 x 2.4 cm heterogenous right level 2 lymph node concerning for metastasis.  There is an 8 mm right thyroid nodule which is nonspecific.      Subjective:    • 21.  Patient presents for initial consultation.   She noticed the right neck mass right before [place].  It was about 3 cm.  It appeared to grow initially.  Since February it has remained stable.  Denies any enlarged lymph nodes elsewhere.  He does report history of having hidradenitis in the groin and axilla and had I&D twice.  She does not report any B symptoms.  She is a smoker.  Does not have any dysphagia.  Does not report any pain on the right neck.  He denies any B symptoms or weight loss.  No cough or hemoptysis.        The following portions of  the patient's history were reviewed and updated as appropriate: allergies.    Past Medical History:   Diagnosis Date   • Allergic rhinitis    • Anemia    • Kidney stone    • Psoriasis    • RTA (renal tubular acidosis)        Past Surgical History:   Procedure Laterality Date   • DILATATION AND CURETTAGE     • FOOT SURGERY           Current Outpatient Medications:   •  clobetasol (TEMOVATE) 0.05 % ointment, , Disp: , Rfl:   •  potassium chloride (KLOR-CON) 20 MEQ CR tablet, KLOR-CON M20 20 MEQ CR-TABS, Disp: , Rfl:   •  spironolactone (ALDACTONE) 25 MG tablet, Take 1 tablet by mouth Daily., Disp: 90 tablet, Rfl: 1  •  albuterol sulfate HFA (ProAir HFA) 108 (90 Base) MCG/ACT inhaler, PROAIR  (90 Base) MCG/ACT AERS, Disp: , Rfl:     Current Facility-Administered Medications:   •  cyanocobalamin injection 1,000 mcg, 1,000 mcg, Intramuscular, Q28 Days, Ophelia Benson MD, 1,000 mcg at 01/11/21 1158    No Known Allergies    Family History   Adopted: Yes   Family history unknown: Yes       She was adopted. Family history is unknown by patient.    Social History     Tobacco Use   • Smoking status: Current Every Day Smoker     Packs/day: 0.50     Years: 35.00     Pack years: 17.50   • Smokeless tobacco: Never Used   Substance Use Topics   • Alcohol use: Yes   • Drug use: Never     Social History     Social History Narrative    Patient lives in [place], [place], She has lived there for 2 years by herself. Patient is single and has three children, ages 32. 26 and 24 years of age.        ROS:     Review of Systems   Constitutional: Negative for chills and fever.   HENT: Negative for ear pain, mouth sores, nosebleeds and sore throat.         Enlarged lymph node in the right neck   Eyes: Negative for photophobia and visual disturbance.   Respiratory: Negative for wheezing and stridor.    Cardiovascular: Negative for chest pain and palpitations.   Gastrointestinal: Negative for abdominal pain, diarrhea, nausea and  "vomiting.   Endocrine: Negative for cold intolerance and heat intolerance.   Genitourinary: Negative for dysuria and hematuria.   Musculoskeletal: Negative for joint swelling and neck stiffness.   Skin: Negative for color change and rash.   Neurological: Negative for seizures and syncope.   Hematological: Negative for adenopathy.        No obvious bleeding   Psychiatric/Behavioral: Negative for agitation, confusion and hallucinations.         Objective:    Vitals:    02/08/21 1510   Resp: 18   Temp: 97.9 °F (36.6 °C)   Weight: 101 kg (223 lb 3.2 oz)   Height: 170.2 cm (67\")   PainSc: 0-No pain     Body mass index is 34.96 kg/m².  ECOG  (0) Fully active, able to carry on all predisease performance without restriction    Physical Exam:     Physical Exam  Vitals signs and nursing note reviewed.   Constitutional:       General: She is not in acute distress.     Appearance: She is not diaphoretic.   HENT:      Head: Normocephalic and atraumatic.   Eyes:      General: No scleral icterus.        Right eye: No discharge.         Left eye: No discharge.      Conjunctiva/sclera: Conjunctivae normal.   Neck:      Musculoskeletal: Normal range of motion and neck supple.      Thyroid: No thyromegaly.   Cardiovascular:      Rate and Rhythm: Normal rate and regular rhythm.      Heart sounds: Normal heart sounds. No friction rub. No gallop.    Pulmonary:      Effort: Pulmonary effort is normal. No respiratory distress.      Breath sounds: No stridor. No wheezing.   Abdominal:      General: Bowel sounds are normal.      Palpations: Abdomen is soft. There is no mass.      Tenderness: There is no abdominal tenderness. There is no guarding or rebound.   Musculoskeletal: Normal range of motion.         General: No tenderness.   Lymphadenopathy:      Cervical: No cervical adenopathy.      Comments: There is a lump palpated on the right side of the neck just below the jaw, it is about 4 cm in diameter, is not very mobile, no palpation " tenderness.  No skin associated changes.   Skin:     General: Skin is warm.      Findings: No erythema or rash.   Neurological:      General: No focal deficit present.      Mental Status: She is alert and oriented to person, place, and time.      Motor: No abnormal muscle tone.   Psychiatric:         Mood and Affect: Mood normal.         Behavior: Behavior normal.         Thought Content: Thought content normal.               Lab Results - Last 18 Months   Lab Units 02/08/21  1503 01/11/21  1226   WBC 10*3/mm3 14.57* 13.33*   HEMOGLOBIN g/dL 15.5 15.6   HEMATOCRIT % 48.7* 48.8*   PLATELETS 10*3/mm3 232 378   MCV fL 86.7 86.7     Lab Results - Last 18 Months   Lab Units 01/11/21  1226   SODIUM mmol/L 137   POTASSIUM mmol/L 3.6   CHLORIDE mmol/L 103   CO2 mmol/L 25.4   BUN mg/dL 11   CREATININE mg/dL 0.71   CALCIUM mg/dL 9.1   BILIRUBIN mg/dL 0.3   ALK PHOS U/L 128*   ALT (SGPT) U/L 35*   AST (SGOT) U/L 26   GLUCOSE mg/dL 106*       Lab Results   Component Value Date    GLUCOSE 106 (H) 01/11/2021    BUN 11 01/11/2021    CREATININE 0.71 01/11/2021    EGFRIFNONA 87 01/11/2021    BCR 15.5 01/11/2021    K 3.6 01/11/2021    CO2 25.4 01/11/2021    CALCIUM 9.1 01/11/2021    ALBUMIN 3.90 01/11/2021    LABIL2 1.2 08/18/2018    AST 26 01/11/2021    ALT 35 (H) 01/11/2021       No results for input(s): APTT, INR, PTT in the last 98603 hours.    No results found for: IRON, TIBC, FERRITIN    No results found for: FOLATE    No results found for: OCCULTBLD    No results found for: RETICCTPCT    Lab Results   Component Value Date    DFXZXALE62 559 06/17/2020     No results found for: SPEP, UPEP  No results found for: LDH, URICACID  No results found for: LATA, RF, SEDRATE  No results found for: FIBRINOGEN, HAPTOGLOBIN  No results found for: PTT, INR  No results found for:   No results found for: CEA  No components found for: CA-19-9  No results found for: PSA  No results found for: AFPTM, MP5646, HCGTM, SPEP, WILMAN          Assessment/Plan     Assessment:    1. Right neck mass: Ultrasound shows a 3.6 x 2.2 x 3.7 cm large neck mass with some central necrosis.  CT neck on 1/19/2021 shows a 2.5 x 2.4 cm heterogenous mass.  CT chest abdomen pelvis were performed and it fails to show any evidence of malignancy in the chest abdomen and pelvis.  Differential diagnosis for the right neck mass includes lymphoma or head and neck cancer etc. Reactive lymphadenopathy seems less likely due to the size.  Patient does not report any B symptoms.  2. Tobacco use: Patient smokes about half pack per day since age 15.  3. Chronic leukocytosis: Differential count shows predominant neutrophilia.  Patient smoker.  She is obese.  4. Right adrenal nodule: Measures 1.5 cm.  Appears to be an adenoma.          Plan:  1. ENT consultation with the Dr. ZURITA.  Patient needs biopsy of the right neck mass and also patient will need fiberoptic laryngoscopy to evaluate the oropharynx and pharynx, since she is a smoker  2. Lab work: ESR, CRP, LDH, serum LATA, TSH.  3. Check serum cortisol level.  4. Follow-up in about 2 and half weeks.  After biopsy                      Thank you very much for providing the opportunity to participate in this patient’s care. Please do not hesitate to call if there are any other questions    I have reviewed and confirmed the accuracy of the patient's history: Chief complaint, HPI, ROS, Subjective and Past Family Social History as entered by the MA/LPN/RN. Kan Steele MD 02/08/21       Electronically signed by Kan Steele MD, 02/08/21, 3:52 PM EST.

## 2021-02-08 NOTE — TELEPHONE ENCOUNTER
"Patient saw Dr. Steele today.  Patient wanted to know why Dr. Steele wanted to see her back in 2.5 weeks.  I informed her that this was go go over the labs that he was ordering today.  Patient states \"I really don't think it is necessary that I follow up with Dr. Steele.  I will f/u with Dr. Day.\"  Patient sent to Lab to have labs drawn.  "

## 2021-02-09 DIAGNOSIS — N20.0 KIDNEY STONES: Primary | ICD-10-CM

## 2021-02-10 LAB — ANA SER QL: NEGATIVE

## 2021-02-16 ENCOUNTER — TELEPHONE (OUTPATIENT)
Dept: ONCOLOGY | Facility: CLINIC | Age: 51
End: 2021-02-16

## 2021-02-16 NOTE — TELEPHONE ENCOUNTER
Caller: BRUCE YI     Relationship to patient: SELF    Best call back number: [phone]    Patient is needing: PT IS CALLING TO CHECK ON ENT REFERRAL. PT SATES SHE HAS NOT HEARD ANYTHING AND IT HAS BEEN A WEEK.

## 2021-02-16 NOTE — TELEPHONE ENCOUNTER
Patient scheduled with Dr. ZURITA on Monday 2-22-21 at 1250PM.  Referral and patient records faxed.  Patient notified and v/u.

## 2021-02-22 ENCOUNTER — TRANSCRIBE ORDERS (OUTPATIENT)
Dept: ADMINISTRATIVE | Facility: HOSPITAL | Age: 51
End: 2021-02-22

## 2021-02-22 DIAGNOSIS — N20.0 RENAL CALCULUS: Primary | ICD-10-CM

## 2021-02-26 ENCOUNTER — HOSPITAL ENCOUNTER (OUTPATIENT)
Dept: GENERAL RADIOLOGY | Facility: HOSPITAL | Age: 51
Discharge: HOME | End: 2021-02-26
Admitting: UROLOGY

## 2021-02-26 DIAGNOSIS — N20.0 RENAL CALCULUS: ICD-10-CM

## 2021-02-26 LAB — CREAT BLDA-MCNC: 0.9 MG/DL (ref 0.6–1.3)

## 2021-02-26 PROCEDURE — 82565 ASSAY OF CREATININE: CPT

## 2021-02-26 PROCEDURE — 0 IOPAMIDOL PER 1 ML: Performed by: UROLOGY

## 2021-02-26 PROCEDURE — 74400 UROGRAPHY IV +-KUB TOMOG: CPT

## 2021-02-26 RX ADMIN — IOPAMIDOL 100 ML: 755 INJECTION, SOLUTION INTRAVENOUS at 11:00

## 2021-03-02 PROCEDURE — 82365 CALCULUS SPECTROSCOPY: CPT | Performed by: UROLOGY

## 2021-03-03 ENCOUNTER — LAB REQUISITION (OUTPATIENT)
Dept: LAB | Facility: HOSPITAL | Age: 51
End: 2021-03-03

## 2021-03-03 DIAGNOSIS — N20.1 CALCULUS OF URETER: ICD-10-CM

## 2021-03-03 DIAGNOSIS — N20.0 CALCULUS OF KIDNEY: ICD-10-CM

## 2021-03-11 LAB
COLOR STONE: NORMAL
COM MFR STONE: 5 %
COMPN STONE: NORMAL
HYDROXYAPATITE 24H ENGDIFF UR: 95 %
LABORATORY COMMENT REPORT: NORMAL
Lab: NORMAL
Lab: NORMAL
PHOTO: NORMAL
SIZE STONE: NORMAL MM
SPEC SOURCE SUBJ: NORMAL
WT STONE: 102 MG

## 2021-03-17 ENCOUNTER — CLINICAL SUPPORT (OUTPATIENT)
Dept: FAMILY MEDICINE CLINIC | Facility: CLINIC | Age: 51
End: 2021-03-17

## 2021-03-17 DIAGNOSIS — E53.8 B12 DEFICIENCY: Primary | ICD-10-CM

## 2021-03-17 PROCEDURE — 96372 THER/PROPH/DIAG INJ SC/IM: CPT | Performed by: FAMILY MEDICINE

## 2021-03-17 RX ORDER — CYANOCOBALAMIN 1000 UG/ML
1000 INJECTION, SOLUTION INTRAMUSCULAR; SUBCUTANEOUS ONCE
Status: COMPLETED | OUTPATIENT
Start: 2021-03-17 | End: 2021-03-17

## 2021-03-17 RX ADMIN — CYANOCOBALAMIN 1000 MCG: 1000 INJECTION, SOLUTION INTRAMUSCULAR; SUBCUTANEOUS at 10:30

## 2021-04-19 ENCOUNTER — CLINICAL SUPPORT (OUTPATIENT)
Dept: FAMILY MEDICINE CLINIC | Facility: CLINIC | Age: 51
End: 2021-04-19

## 2021-04-19 DIAGNOSIS — E53.8 B12 DEFICIENCY: Primary | ICD-10-CM

## 2021-04-19 PROCEDURE — 96372 THER/PROPH/DIAG INJ SC/IM: CPT | Performed by: FAMILY MEDICINE

## 2021-04-19 RX ORDER — CYANOCOBALAMIN 1000 UG/ML
1000 INJECTION, SOLUTION INTRAMUSCULAR; SUBCUTANEOUS ONCE
Status: COMPLETED | OUTPATIENT
Start: 2021-04-19 | End: 2021-04-19

## 2021-04-19 RX ADMIN — CYANOCOBALAMIN 1000 MCG: 1000 INJECTION, SOLUTION INTRAMUSCULAR; SUBCUTANEOUS at 08:47

## 2021-05-19 ENCOUNTER — CLINICAL SUPPORT (OUTPATIENT)
Dept: FAMILY MEDICINE CLINIC | Facility: CLINIC | Age: 51
End: 2021-05-19

## 2021-05-19 DIAGNOSIS — E53.8 B12 DEFICIENCY: Primary | ICD-10-CM

## 2021-05-19 PROCEDURE — 96372 THER/PROPH/DIAG INJ SC/IM: CPT | Performed by: FAMILY MEDICINE

## 2021-05-19 RX ORDER — CYANOCOBALAMIN 1000 UG/ML
1000 INJECTION, SOLUTION INTRAMUSCULAR; SUBCUTANEOUS ONCE
Status: COMPLETED | OUTPATIENT
Start: 2021-05-19 | End: 2021-05-19

## 2021-05-19 RX ADMIN — CYANOCOBALAMIN 1000 MCG: 1000 INJECTION, SOLUTION INTRAMUSCULAR; SUBCUTANEOUS at 10:46

## 2021-05-25 ENCOUNTER — OFFICE VISIT (OUTPATIENT)
Dept: FAMILY MEDICINE CLINIC | Facility: CLINIC | Age: 51
End: 2021-05-25

## 2021-05-25 ENCOUNTER — LAB (OUTPATIENT)
Dept: FAMILY MEDICINE CLINIC | Facility: CLINIC | Age: 51
End: 2021-05-25

## 2021-05-25 VITALS
SYSTOLIC BLOOD PRESSURE: 149 MMHG | HEART RATE: 84 BPM | RESPIRATION RATE: 16 BRPM | HEIGHT: 67 IN | DIASTOLIC BLOOD PRESSURE: 78 MMHG | TEMPERATURE: 97.6 F | WEIGHT: 217 LBS | BODY MASS INDEX: 34.06 KG/M2 | OXYGEN SATURATION: 95 %

## 2021-05-25 DIAGNOSIS — R19.7 DIARRHEA, UNSPECIFIED TYPE: ICD-10-CM

## 2021-05-25 DIAGNOSIS — R19.7 DIARRHEA, UNSPECIFIED TYPE: Primary | ICD-10-CM

## 2021-05-25 LAB
ADV 40+41 DNA STL QL NAA+NON-PROBE: NOT DETECTED
ALBUMIN SERPL-MCNC: 3.8 G/DL (ref 3.5–5.2)
ALBUMIN/GLOB SERPL: 1.1 G/DL
ALP SERPL-CCNC: 123 U/L (ref 39–117)
ALT SERPL W P-5'-P-CCNC: 17 U/L (ref 1–33)
ANION GAP SERPL CALCULATED.3IONS-SCNC: 9.7 MMOL/L (ref 5–15)
AST SERPL-CCNC: 21 U/L (ref 1–32)
ASTRO TYP 1-8 RNA STL QL NAA+NON-PROBE: NOT DETECTED
BASOPHILS # BLD AUTO: 0.11 10*3/MM3 (ref 0–0.2)
BASOPHILS NFR BLD AUTO: 0.8 % (ref 0–1.5)
BILIRUB SERPL-MCNC: <0.2 MG/DL (ref 0–1.2)
BUN SERPL-MCNC: 10 MG/DL (ref 6–20)
BUN/CREAT SERPL: 14.7 (ref 7–25)
C CAYETANENSIS DNA STL QL NAA+NON-PROBE: NOT DETECTED
C COLI+JEJ+UPSA DNA STL QL NAA+NON-PROBE: DETECTED
CALCIUM SPEC-SCNC: 9.8 MG/DL (ref 8.6–10.5)
CHLORIDE SERPL-SCNC: 104 MMOL/L (ref 98–107)
CO2 SERPL-SCNC: 24.3 MMOL/L (ref 22–29)
CREAT SERPL-MCNC: 0.68 MG/DL (ref 0.57–1)
CRYPTOSP DNA STL QL NAA+NON-PROBE: NOT DETECTED
DEPRECATED RDW RBC AUTO: 42.9 FL (ref 37–54)
E HISTOLYT DNA STL QL NAA+NON-PROBE: NOT DETECTED
EAEC PAA PLAS AGGR+AATA ST NAA+NON-PRB: NOT DETECTED
EC STX1+STX2 GENES STL QL NAA+NON-PROBE: NOT DETECTED
EOSINOPHIL # BLD AUTO: 0.26 10*3/MM3 (ref 0–0.4)
EOSINOPHIL NFR BLD AUTO: 1.9 % (ref 0.3–6.2)
EPEC EAE GENE STL QL NAA+NON-PROBE: NOT DETECTED
ERYTHROCYTE [DISTWIDTH] IN BLOOD BY AUTOMATED COUNT: 13.6 % (ref 12.3–15.4)
ETEC LTA+ST1A+ST1B TOX ST NAA+NON-PROBE: NOT DETECTED
G LAMBLIA DNA STL QL NAA+NON-PROBE: NOT DETECTED
GFR SERPL CREATININE-BSD FRML MDRD: 92 ML/MIN/1.73
GLOBULIN UR ELPH-MCNC: 3.4 GM/DL
GLUCOSE SERPL-MCNC: 89 MG/DL (ref 65–99)
HCT VFR BLD AUTO: 46.3 % (ref 34–46.6)
HGB BLD-MCNC: 15 G/DL (ref 12–15.9)
IMM GRANULOCYTES # BLD AUTO: 0.15 10*3/MM3 (ref 0–0.05)
IMM GRANULOCYTES NFR BLD AUTO: 1.1 % (ref 0–0.5)
LYMPHOCYTES # BLD AUTO: 3.19 10*3/MM3 (ref 0.7–3.1)
LYMPHOCYTES NFR BLD AUTO: 23 % (ref 19.6–45.3)
MCH RBC QN AUTO: 27.8 PG (ref 26.6–33)
MCHC RBC AUTO-ENTMCNC: 32.4 G/DL (ref 31.5–35.7)
MCV RBC AUTO: 85.9 FL (ref 79–97)
MONOCYTES # BLD AUTO: 1.32 10*3/MM3 (ref 0.1–0.9)
MONOCYTES NFR BLD AUTO: 9.5 % (ref 5–12)
NEUTROPHILS NFR BLD AUTO: 63.7 % (ref 42.7–76)
NEUTROPHILS NFR BLD AUTO: 8.86 10*3/MM3 (ref 1.7–7)
NOROVIRUS GI+II RNA STL QL NAA+NON-PROBE: NOT DETECTED
NRBC BLD AUTO-RTO: 0 /100 WBC (ref 0–0.2)
P SHIGELLOIDES DNA STL QL NAA+NON-PROBE: NOT DETECTED
PLATELET # BLD AUTO: 430 10*3/MM3 (ref 140–450)
PMV BLD AUTO: 9.7 FL (ref 6–12)
POTASSIUM SERPL-SCNC: 4.3 MMOL/L (ref 3.5–5.2)
PROT SERPL-MCNC: 7.2 G/DL (ref 6–8.5)
RBC # BLD AUTO: 5.39 10*6/MM3 (ref 3.77–5.28)
RVA RNA STL QL NAA+NON-PROBE: NOT DETECTED
S ENT+BONG DNA STL QL NAA+NON-PROBE: NOT DETECTED
SAPO I+II+IV+V RNA STL QL NAA+NON-PROBE: NOT DETECTED
SHIGELLA SP+EIEC IPAH ST NAA+NON-PROBE: NOT DETECTED
SODIUM SERPL-SCNC: 138 MMOL/L (ref 136–145)
V CHOL+PARA+VUL DNA STL QL NAA+NON-PROBE: NOT DETECTED
V CHOLERAE DNA STL QL NAA+NON-PROBE: NOT DETECTED
WBC # BLD AUTO: 13.89 10*3/MM3 (ref 3.4–10.8)
Y ENTEROCOL DNA STL QL NAA+NON-PROBE: NOT DETECTED

## 2021-05-25 PROCEDURE — 36415 COLL VENOUS BLD VENIPUNCTURE: CPT | Performed by: FAMILY MEDICINE

## 2021-05-25 PROCEDURE — 99214 OFFICE O/P EST MOD 30 MIN: CPT | Performed by: FAMILY MEDICINE

## 2021-05-25 PROCEDURE — 87449 NOS EACH ORGANISM AG IA: CPT | Performed by: FAMILY MEDICINE

## 2021-05-25 PROCEDURE — 85025 COMPLETE CBC W/AUTO DIFF WBC: CPT | Performed by: FAMILY MEDICINE

## 2021-05-25 PROCEDURE — 87086 URINE CULTURE/COLONY COUNT: CPT | Performed by: FAMILY MEDICINE

## 2021-05-25 PROCEDURE — 87324 CLOSTRIDIUM AG IA: CPT | Performed by: FAMILY MEDICINE

## 2021-05-25 PROCEDURE — 0097U HC BIOFIRE FILMARRAY GI PANEL: CPT | Performed by: FAMILY MEDICINE

## 2021-05-25 PROCEDURE — 81001 URINALYSIS AUTO W/SCOPE: CPT | Performed by: FAMILY MEDICINE

## 2021-05-25 PROCEDURE — 80053 COMPREHEN METABOLIC PANEL: CPT | Performed by: FAMILY MEDICINE

## 2021-05-25 RX ORDER — METRONIDAZOLE 500 MG/1
500 TABLET ORAL 3 TIMES DAILY
Qty: 21 TABLET | Refills: 0 | Status: SHIPPED | OUTPATIENT
Start: 2021-05-25 | End: 2022-03-11

## 2021-05-25 RX ORDER — SACCHAROMYCES BOULARDII 250 MG
250 CAPSULE ORAL 2 TIMES DAILY
Qty: 30 CAPSULE | Refills: 0 | Status: SHIPPED | OUTPATIENT
Start: 2021-05-25 | End: 2022-03-11

## 2021-05-25 NOTE — PROGRESS NOTES
Subjective   Chief Complaint   Patient presents with   • Diarrhea     5 days     Claire Moran is a 51 y.o. female.     Patient Care Team:  Ophelia Benson MD as PCP - General (Family Medicine)  Quinn Allen MD as Consulting Physician (Nephrology)  Oz Bass MD (Otolaryngology)    She is coming in today due to diarrhea, which he has been dealing with for the last 5 days.  It started fairly suddenly with some abdominal cramping and multiple bowel movements throughout the day and at night at the beginning.  She also had some chills and fatigue and felt very tired.  Over the last few days chills have resolved and she tells me that last night was the first night when she did not wake up to go to the bathroom.  She however already has gone multiple times today.  The color of the stool is light brown.  She was out to eat the night before her symptoms started, but as far as she knows no one else is sick.  She is able to eat and drink just fine.  However she is concerned that everything just runs through her.  She has been on BRAT diet.  She denies any antibiotic exposure within the last 3 months.       The following portions of the patient's history were reviewed and updated as appropriate: allergies, current medications, past family history, past medical history, past social history, past surgical history and problem list.  Past Medical History:   Diagnosis Date   • Allergic rhinitis    • Anemia    • Kidney stone    • Psoriasis    • RTA (renal tubular acidosis)      Past Surgical History:   Procedure Laterality Date   • DILATATION AND CURETTAGE     • FOOT SURGERY     • PAROTID BIOPSY/TUMOR EXCISION  04/2021    [place] tumor     The patient has a family history of  Family History   Adopted: Yes   Family history unknown: Yes     Social History     Socioeconomic History   • Marital status:      Spouse name: Not on file   • Number of children: 3   • Years of education: Not on file   • Highest education  "level: Not on file   Tobacco Use   • Smoking status: Current Every Day Smoker     Packs/day: 0.50     Years: 35.00     Pack years: 17.50   • Smokeless tobacco: Never Used   Substance and Sexual Activity   • Alcohol use: Yes   • Drug use: Never   • Sexual activity: Defer       Review of Systems   Constitutional: Positive for chills, fatigue and fever.   Gastrointestinal: Positive for diarrhea, nausea and vomiting. Negative for abdominal pain.   Genitourinary: Negative for difficulty urinating, dysuria, flank pain and frequency.     Visit Vitals  /78 (BP Location: Left arm, Patient Position: Sitting, Cuff Size: Adult)   Pulse 84   Temp 97.6 °F (36.4 °C) (Temporal)   Resp 16   Ht 170.2 cm (67\")   Wt 98.4 kg (217 lb)   SpO2 95%   BMI 33.99 kg/m²       Current Outpatient Medications:   •  albuterol sulfate HFA (ProAir HFA) 108 (90 Base) MCG/ACT inhaler, PROAIR  (90 Base) MCG/ACT AERS, Disp: , Rfl:   •  clobetasol (TEMOVATE) 0.05 % ointment, , Disp: , Rfl:   •  metroNIDAZOLE (FLAGYL) 500 MG tablet, Take 1 tablet by mouth 3 (Three) Times a Day., Disp: 21 tablet, Rfl: 0  •  potassium chloride (KLOR-CON) 20 MEQ CR tablet, KLOR-CON M20 20 MEQ CR-TABS, Disp: , Rfl:   •  saccharomyces boulardii (Florastor) 250 MG capsule, Take 1 capsule by mouth 2 (Two) Times a Day., Disp: 30 capsule, Rfl: 0  •  spironolactone (ALDACTONE) 25 MG tablet, Take 1 tablet by mouth Daily., Disp: 90 tablet, Rfl: 1    Objective   Physical Exam  Constitutional:       General: She is not in acute distress.     Appearance: Normal appearance. She is well-developed. She is not ill-appearing or diaphoretic.      Comments: Patient is in no distress, patient has normal voice and speech.  Normal respiratory effort.   HENT:      Head: Normocephalic and atraumatic.   Pulmonary:      Effort: Pulmonary effort is normal.   Musculoskeletal:      Cervical back: Normal range of motion and neck supple.   Neurological:      General: No focal deficit present. "      Mental Status: She is alert and oriented to person, place, and time. Mental status is at baseline.   Psychiatric:         Mood and Affect: Mood normal.         Assessment/Plan   Diagnoses and all orders for this visit:    1. Diarrhea, unspecified type (Primary)  -     CBC Auto Differential  -     Comprehensive Metabolic Panel  -     Urinalysis With Culture If Indicated - Urine, Clean Catch  -     Clostridium Difficile Toxin, PCR - Stool, Per Rectum; Future  -     Gastrointestinal Panel, PCR - Stool, Per Rectum; Future  -     metroNIDAZOLE (FLAGYL) 500 MG tablet; Take 1 tablet by mouth 3 (Three) Times a Day.  Dispense: 21 tablet; Refill: 0  -     saccharomyces boulardii (Florastor) 250 MG capsule; Take 1 capsule by mouth 2 (Two) Times a Day.  Dispense: 30 capsule; Refill: 0      Considering her ongoing symptoms with diarrhea I will be getting blood work and urinalysis and I will be also getting stool studies.  I will be starting her on Flagyl and I also gave her prescription for a probiotic.  Importance of good p.o. hydration was stressed and it looks like she is doing well with that.  I will advise further once the results of the tests are available.        Return if symptoms worsen or fail to improve, for Recheck.    Requested Prescriptions     Signed Prescriptions Disp Refills   • metroNIDAZOLE (FLAGYL) 500 MG tablet 21 tablet 0     Sig: Take 1 tablet by mouth 3 (Three) Times a Day.   • saccharomyces boulardii (Florastor) 250 MG capsule 30 capsule 0     Sig: Take 1 capsule by mouth 2 (Two) Times a Day.

## 2021-05-26 LAB
BACTERIA UR QL AUTO: ABNORMAL /HPF
BILIRUB UR QL STRIP: NEGATIVE
C DIFF GDH STL QL: NEGATIVE
CLARITY UR: ABNORMAL
COLOR UR: YELLOW
GLUCOSE UR STRIP-MCNC: NEGATIVE MG/DL
HGB UR QL STRIP.AUTO: NEGATIVE
HYALINE CASTS UR QL AUTO: ABNORMAL /LPF
KETONES UR QL STRIP: NEGATIVE
LEUKOCYTE ESTERASE UR QL STRIP.AUTO: ABNORMAL
NITRITE UR QL STRIP: NEGATIVE
PH UR STRIP.AUTO: 6.5 [PH] (ref 5–8)
PROT UR QL STRIP: NEGATIVE
RBC # UR: ABNORMAL /HPF
REF LAB TEST METHOD: ABNORMAL
SP GR UR STRIP: 1.01 (ref 1–1.03)
SQUAMOUS #/AREA URNS HPF: ABNORMAL /HPF
UROBILINOGEN UR QL STRIP: ABNORMAL
WBC UR QL AUTO: ABNORMAL /HPF

## 2021-05-26 RX ORDER — AZITHROMYCIN 500 MG/1
500 TABLET, FILM COATED ORAL DAILY
Qty: 3 TABLET | Refills: 0 | Status: SHIPPED | OUTPATIENT
Start: 2021-05-26 | End: 2022-03-11

## 2021-05-27 LAB — BACTERIA SPEC AEROBE CULT: NORMAL

## 2021-06-21 ENCOUNTER — CLINICAL SUPPORT (OUTPATIENT)
Dept: FAMILY MEDICINE CLINIC | Facility: CLINIC | Age: 51
End: 2021-06-21

## 2021-06-21 DIAGNOSIS — E53.8 B12 DEFICIENCY: Primary | ICD-10-CM

## 2021-06-21 PROCEDURE — 96372 THER/PROPH/DIAG INJ SC/IM: CPT | Performed by: FAMILY MEDICINE

## 2021-06-21 RX ORDER — CYANOCOBALAMIN 1000 UG/ML
1000 INJECTION, SOLUTION INTRAMUSCULAR; SUBCUTANEOUS ONCE
Status: COMPLETED | OUTPATIENT
Start: 2021-06-21 | End: 2021-06-21

## 2021-06-21 RX ADMIN — CYANOCOBALAMIN 1000 MCG: 1000 INJECTION, SOLUTION INTRAMUSCULAR; SUBCUTANEOUS at 08:41

## 2021-07-11 ENCOUNTER — TELEPHONE (OUTPATIENT)
Dept: FAMILY MEDICINE CLINIC | Facility: CLINIC | Age: 51
End: 2021-07-11

## 2021-07-11 NOTE — TELEPHONE ENCOUNTER
Patient has been erroneously marked as diabetic. Based on the available clinical information, she does not have diabetes and should therefore be excluded from diabetic health maintenance and quality measures for the remainder of the reporting period.

## 2021-07-21 ENCOUNTER — CLINICAL SUPPORT (OUTPATIENT)
Dept: FAMILY MEDICINE CLINIC | Facility: CLINIC | Age: 51
End: 2021-07-21

## 2021-07-21 DIAGNOSIS — E53.8 B12 DEFICIENCY: Primary | ICD-10-CM

## 2021-07-21 PROCEDURE — 96372 THER/PROPH/DIAG INJ SC/IM: CPT | Performed by: FAMILY MEDICINE

## 2021-07-21 RX ORDER — CYANOCOBALAMIN 1000 UG/ML
1000 INJECTION, SOLUTION INTRAMUSCULAR; SUBCUTANEOUS ONCE
Status: COMPLETED | OUTPATIENT
Start: 2021-07-21 | End: 2021-07-21

## 2021-07-21 RX ADMIN — CYANOCOBALAMIN 1000 MCG: 1000 INJECTION, SOLUTION INTRAMUSCULAR; SUBCUTANEOUS at 08:53

## 2021-08-25 ENCOUNTER — CLINICAL SUPPORT (OUTPATIENT)
Dept: FAMILY MEDICINE CLINIC | Facility: CLINIC | Age: 51
End: 2021-08-25

## 2021-08-25 DIAGNOSIS — E53.8 B12 DEFICIENCY: Primary | ICD-10-CM

## 2021-08-25 PROCEDURE — 96372 THER/PROPH/DIAG INJ SC/IM: CPT | Performed by: FAMILY MEDICINE

## 2021-08-25 RX ORDER — CYANOCOBALAMIN 1000 UG/ML
1000 INJECTION, SOLUTION INTRAMUSCULAR; SUBCUTANEOUS
Status: DISCONTINUED | OUTPATIENT
Start: 2021-08-25 | End: 2021-12-22

## 2021-08-25 RX ADMIN — CYANOCOBALAMIN 1000 MCG: 1000 INJECTION, SOLUTION INTRAMUSCULAR; SUBCUTANEOUS at 08:39

## 2021-09-02 ENCOUNTER — TRANSCRIBE ORDERS (OUTPATIENT)
Dept: ADMINISTRATIVE | Facility: HOSPITAL | Age: 51
End: 2021-09-02

## 2021-09-02 DIAGNOSIS — N20.0 RENAL STONES: Primary | ICD-10-CM

## 2021-09-08 ENCOUNTER — LAB (OUTPATIENT)
Dept: LAB | Facility: HOSPITAL | Age: 51
End: 2021-09-08

## 2021-09-08 ENCOUNTER — HOSPITAL ENCOUNTER (OUTPATIENT)
Dept: CARDIOLOGY | Facility: HOSPITAL | Age: 51
Discharge: HOME | End: 2021-09-08

## 2021-09-08 DIAGNOSIS — N20.0 RENAL STONES: ICD-10-CM

## 2021-09-08 LAB
ANION GAP SERPL CALCULATED.3IONS-SCNC: 12.1 MMOL/L (ref 5–15)
BASOPHILS # BLD AUTO: 0.12 10*3/MM3 (ref 0–0.2)
BASOPHILS NFR BLD AUTO: 1 % (ref 0–1.5)
BUN SERPL-MCNC: 11 MG/DL (ref 6–20)
BUN/CREAT SERPL: 15.5 (ref 7–25)
CALCIUM SPEC-SCNC: 9.4 MG/DL (ref 8.6–10.5)
CHLORIDE SERPL-SCNC: 102 MMOL/L (ref 98–107)
CO2 SERPL-SCNC: 21.9 MMOL/L (ref 22–29)
CREAT SERPL-MCNC: 0.71 MG/DL (ref 0.57–1)
DEPRECATED RDW RBC AUTO: 43.5 FL (ref 37–54)
EOSINOPHIL # BLD AUTO: 0.05 10*3/MM3 (ref 0–0.4)
EOSINOPHIL NFR BLD AUTO: 0.4 % (ref 0.3–6.2)
ERYTHROCYTE [DISTWIDTH] IN BLOOD BY AUTOMATED COUNT: 13.9 % (ref 12.3–15.4)
GFR SERPL CREATININE-BSD FRML MDRD: 87 ML/MIN/1.73
GLUCOSE SERPL-MCNC: 111 MG/DL (ref 65–99)
HCT VFR BLD AUTO: 46.8 % (ref 34–46.6)
HGB BLD-MCNC: 15.6 G/DL (ref 12–15.9)
IMM GRANULOCYTES # BLD AUTO: 0.11 10*3/MM3 (ref 0–0.05)
IMM GRANULOCYTES NFR BLD AUTO: 0.9 % (ref 0–0.5)
LYMPHOCYTES # BLD AUTO: 2.67 10*3/MM3 (ref 0.7–3.1)
LYMPHOCYTES NFR BLD AUTO: 22.5 % (ref 19.6–45.3)
MCH RBC QN AUTO: 28.5 PG (ref 26.6–33)
MCHC RBC AUTO-ENTMCNC: 33.3 G/DL (ref 31.5–35.7)
MCV RBC AUTO: 85.6 FL (ref 79–97)
MONOCYTES # BLD AUTO: 1.02 10*3/MM3 (ref 0.1–0.9)
MONOCYTES NFR BLD AUTO: 8.6 % (ref 5–12)
NEUTROPHILS NFR BLD AUTO: 66.6 % (ref 42.7–76)
NEUTROPHILS NFR BLD AUTO: 7.9 10*3/MM3 (ref 1.7–7)
NRBC BLD AUTO-RTO: 0 /100 WBC (ref 0–0.2)
PLATELET # BLD AUTO: 376 10*3/MM3 (ref 140–450)
PMV BLD AUTO: 10 FL (ref 6–12)
POTASSIUM SERPL-SCNC: 4.2 MMOL/L (ref 3.5–5.2)
RBC # BLD AUTO: 5.47 10*6/MM3 (ref 3.77–5.28)
SARS-COV-2 ORF1AB RESP QL NAA+PROBE: NOT DETECTED
SODIUM SERPL-SCNC: 136 MMOL/L (ref 136–145)
WBC # BLD AUTO: 11.87 10*3/MM3 (ref 3.4–10.8)

## 2021-09-08 PROCEDURE — 80048 BASIC METABOLIC PNL TOTAL CA: CPT

## 2021-09-08 PROCEDURE — 36415 COLL VENOUS BLD VENIPUNCTURE: CPT

## 2021-09-08 PROCEDURE — 85025 COMPLETE CBC W/AUTO DIFF WBC: CPT

## 2021-09-08 PROCEDURE — U0004 COV-19 TEST NON-CDC HGH THRU: HCPCS

## 2021-09-08 PROCEDURE — C9803 HOPD COVID-19 SPEC COLLECT: HCPCS

## 2021-09-08 PROCEDURE — 93005 ELECTROCARDIOGRAM TRACING: CPT | Performed by: UROLOGY

## 2021-09-08 PROCEDURE — 93010 ELECTROCARDIOGRAM REPORT: CPT | Performed by: INTERNAL MEDICINE

## 2021-09-09 LAB — QT INTERVAL: 395 MS

## 2021-09-22 ENCOUNTER — CLINICAL SUPPORT (OUTPATIENT)
Dept: FAMILY MEDICINE CLINIC | Facility: CLINIC | Age: 51
End: 2021-09-22

## 2021-09-22 DIAGNOSIS — E53.8 B12 DEFICIENCY: Primary | ICD-10-CM

## 2021-09-22 PROCEDURE — 96372 THER/PROPH/DIAG INJ SC/IM: CPT | Performed by: FAMILY MEDICINE

## 2021-09-22 RX ORDER — CYANOCOBALAMIN 1000 UG/ML
1000 INJECTION, SOLUTION INTRAMUSCULAR; SUBCUTANEOUS
Status: DISCONTINUED | OUTPATIENT
Start: 2021-09-22 | End: 2021-12-22

## 2021-09-22 RX ADMIN — CYANOCOBALAMIN 1000 MCG: 1000 INJECTION, SOLUTION INTRAMUSCULAR; SUBCUTANEOUS at 08:55

## 2021-10-20 ENCOUNTER — CLINICAL SUPPORT (OUTPATIENT)
Dept: FAMILY MEDICINE CLINIC | Facility: CLINIC | Age: 51
End: 2021-10-20

## 2021-10-20 DIAGNOSIS — E53.8 B12 DEFICIENCY: Primary | ICD-10-CM

## 2021-10-20 PROCEDURE — 96372 THER/PROPH/DIAG INJ SC/IM: CPT | Performed by: FAMILY MEDICINE

## 2021-10-20 RX ORDER — CYANOCOBALAMIN 1000 UG/ML
1000 INJECTION, SOLUTION INTRAMUSCULAR; SUBCUTANEOUS
Status: DISCONTINUED | OUTPATIENT
Start: 2021-10-20 | End: 2021-12-22

## 2021-10-20 RX ADMIN — CYANOCOBALAMIN 1000 MCG: 1000 INJECTION, SOLUTION INTRAMUSCULAR; SUBCUTANEOUS at 08:46

## 2021-11-23 ENCOUNTER — CLINICAL SUPPORT (OUTPATIENT)
Dept: FAMILY MEDICINE CLINIC | Facility: CLINIC | Age: 51
End: 2021-11-23

## 2021-11-23 DIAGNOSIS — E53.8 B12 DEFICIENCY: Primary | ICD-10-CM

## 2021-11-23 PROCEDURE — 96372 THER/PROPH/DIAG INJ SC/IM: CPT | Performed by: FAMILY MEDICINE

## 2021-11-23 RX ORDER — CYANOCOBALAMIN 1000 UG/ML
1000 INJECTION, SOLUTION INTRAMUSCULAR; SUBCUTANEOUS ONCE
Status: COMPLETED | OUTPATIENT
Start: 2021-11-23 | End: 2021-11-23

## 2021-11-23 RX ADMIN — CYANOCOBALAMIN 1000 MCG: 1000 INJECTION, SOLUTION INTRAMUSCULAR; SUBCUTANEOUS at 09:24

## 2021-12-22 ENCOUNTER — CLINICAL SUPPORT (OUTPATIENT)
Dept: FAMILY MEDICINE CLINIC | Facility: CLINIC | Age: 51
End: 2021-12-22

## 2021-12-22 DIAGNOSIS — E53.8 B12 DEFICIENCY: Primary | ICD-10-CM

## 2021-12-22 PROCEDURE — 96372 THER/PROPH/DIAG INJ SC/IM: CPT | Performed by: FAMILY MEDICINE

## 2021-12-22 RX ORDER — CYANOCOBALAMIN 1000 UG/ML
1000 INJECTION, SOLUTION INTRAMUSCULAR; SUBCUTANEOUS ONCE
Status: COMPLETED | OUTPATIENT
Start: 2021-12-22 | End: 2021-12-22

## 2021-12-22 RX ADMIN — CYANOCOBALAMIN 1000 MCG: 1000 INJECTION, SOLUTION INTRAMUSCULAR; SUBCUTANEOUS at 08:52

## 2022-01-21 ENCOUNTER — CLINICAL SUPPORT (OUTPATIENT)
Dept: FAMILY MEDICINE CLINIC | Facility: CLINIC | Age: 52
End: 2022-01-21

## 2022-01-21 DIAGNOSIS — E53.8 B12 DEFICIENCY: Primary | ICD-10-CM

## 2022-01-21 PROCEDURE — 96372 THER/PROPH/DIAG INJ SC/IM: CPT | Performed by: FAMILY MEDICINE

## 2022-01-21 RX ORDER — CYANOCOBALAMIN 1000 UG/ML
1000 INJECTION, SOLUTION INTRAMUSCULAR; SUBCUTANEOUS
Status: DISCONTINUED | OUTPATIENT
Start: 2022-01-21 | End: 2022-03-11

## 2022-01-21 RX ADMIN — CYANOCOBALAMIN 1000 MCG: 1000 INJECTION, SOLUTION INTRAMUSCULAR; SUBCUTANEOUS at 08:55

## 2022-02-02 ENCOUNTER — TELEPHONE (OUTPATIENT)
Dept: FAMILY MEDICINE CLINIC | Facility: CLINIC | Age: 52
End: 2022-02-02

## 2022-02-02 NOTE — TELEPHONE ENCOUNTER
I have not seen her since 5/2021.  I did not order any blood work.  Maybe she is bringing orders from outside.  Thank you.

## 2022-02-08 ENCOUNTER — LAB (OUTPATIENT)
Dept: FAMILY MEDICINE CLINIC | Facility: CLINIC | Age: 52
End: 2022-02-08

## 2022-02-08 DIAGNOSIS — E83.59 NEPHROCALCINOSIS: Primary | ICD-10-CM

## 2022-02-08 DIAGNOSIS — R59.0 LYMPHADENOPATHY OF RIGHT CERVICAL REGION: ICD-10-CM

## 2022-02-08 DIAGNOSIS — N29 NEPHROCALCINOSIS: Primary | ICD-10-CM

## 2022-02-08 LAB
ANION GAP SERPL CALCULATED.3IONS-SCNC: 12.3 MMOL/L (ref 5–15)
BUN SERPL-MCNC: 15 MG/DL (ref 6–20)
BUN/CREAT SERPL: 11 (ref 7–25)
CALCIUM SPEC-SCNC: 9.4 MG/DL (ref 8.6–10.5)
CHLORIDE SERPL-SCNC: 100 MMOL/L (ref 98–107)
CO2 SERPL-SCNC: 23.7 MMOL/L (ref 22–29)
CREAT SERPL-MCNC: 1.36 MG/DL (ref 0.57–1)
GFR SERPL CREATININE-BSD FRML MDRD: 41 ML/MIN/1.73
GLUCOSE SERPL-MCNC: 130 MG/DL (ref 65–99)
POTASSIUM SERPL-SCNC: 3.9 MMOL/L (ref 3.5–5.2)
SODIUM SERPL-SCNC: 136 MMOL/L (ref 136–145)

## 2022-02-08 PROCEDURE — 86706 HEP B SURFACE ANTIBODY: CPT | Performed by: INTERNAL MEDICINE

## 2022-02-08 PROCEDURE — 36415 COLL VENOUS BLD VENIPUNCTURE: CPT

## 2022-02-08 PROCEDURE — 80048 BASIC METABOLIC PNL TOTAL CA: CPT | Performed by: INTERNAL MEDICINE

## 2022-02-09 LAB — HBV SURFACE AB SER RIA-ACNC: NORMAL

## 2022-02-21 ENCOUNTER — TRANSCRIBE ORDERS (OUTPATIENT)
Dept: ADMINISTRATIVE | Facility: HOSPITAL | Age: 52
End: 2022-02-21

## 2022-02-21 DIAGNOSIS — N20.0 KIDNEY STONE: Primary | ICD-10-CM

## 2022-03-01 ENCOUNTER — APPOINTMENT (OUTPATIENT)
Dept: CT IMAGING | Facility: HOSPITAL | Age: 52
End: 2022-03-01

## 2022-03-09 ENCOUNTER — HOSPITAL ENCOUNTER (OUTPATIENT)
Dept: CT IMAGING | Facility: HOSPITAL | Age: 52
Discharge: HOME | End: 2022-03-09
Admitting: INTERNAL MEDICINE

## 2022-03-09 ENCOUNTER — TELEPHONE (OUTPATIENT)
Dept: FAMILY MEDICINE CLINIC | Facility: CLINIC | Age: 52
End: 2022-03-09

## 2022-03-09 DIAGNOSIS — N20.0 KIDNEY STONE: ICD-10-CM

## 2022-03-09 PROCEDURE — 74176 CT ABD & PELVIS W/O CONTRAST: CPT

## 2022-03-09 NOTE — TELEPHONE ENCOUNTER
She definitely needs a gynecological exam.  She can schedule appointment with me for that and I will advise further.  Thank you.

## 2022-03-09 NOTE — TELEPHONE ENCOUNTER
Any postmenopausal bleeding and needs evaluation to rule out uterine abnormality.  I believe she sees GYN for her gynecological checkups and she needs to contact them as she will most probably need transvaginal ultrasound and possibly a biopsy.  Please let me know if she has any questions or needs any assistance in the process.  Thank you.

## 2022-03-09 NOTE — TELEPHONE ENCOUNTER
Patient does not have a gyn. She states her nephrologist contacted her urologist today and she had a stat CT of abdomen and pelvis @ [facility] and she was told she has kidney stones. She sees the urologist tomorrow to discuss plan of care. She is still not sure if the blood is from kidney stones or a period. Please ADVISE.

## 2022-03-09 NOTE — TELEPHONE ENCOUNTER
PATIENT CALLED STATING THAT SHE HAS STARTED A HEAVY PERIOD TYPE FLOW AFTER BEING POST MENOPAUSE FOR 1 YEAR.    THE PATIENT IS CONCERNED AND WOULD LIKE CLINICAL OPINION ON HOW SHE SHOULD PROCEED.    PLEASE ADVISE  [phone]

## 2022-03-11 ENCOUNTER — HOSPITAL ENCOUNTER (OUTPATIENT)
Dept: CARDIOLOGY | Facility: HOSPITAL | Age: 52
Discharge: HOME | End: 2022-03-11

## 2022-03-11 ENCOUNTER — TELEPHONE (OUTPATIENT)
Dept: FAMILY MEDICINE CLINIC | Facility: CLINIC | Age: 52
End: 2022-03-11

## 2022-03-11 ENCOUNTER — LAB (OUTPATIENT)
Dept: LAB | Facility: HOSPITAL | Age: 52
End: 2022-03-11

## 2022-03-11 ENCOUNTER — TRANSCRIBE ORDERS (OUTPATIENT)
Dept: ADMINISTRATIVE | Facility: HOSPITAL | Age: 52
End: 2022-03-11

## 2022-03-11 ENCOUNTER — OFFICE VISIT (OUTPATIENT)
Dept: FAMILY MEDICINE CLINIC | Facility: CLINIC | Age: 52
End: 2022-03-11

## 2022-03-11 VITALS
TEMPERATURE: 97.8 F | HEIGHT: 67 IN | HEART RATE: 87 BPM | RESPIRATION RATE: 16 BRPM | OXYGEN SATURATION: 95 % | DIASTOLIC BLOOD PRESSURE: 86 MMHG | WEIGHT: 221.8 LBS | SYSTOLIC BLOOD PRESSURE: 136 MMHG | BODY MASS INDEX: 34.81 KG/M2

## 2022-03-11 DIAGNOSIS — N13.30 HYDRONEPHROSIS, UNSPECIFIED HYDRONEPHROSIS TYPE: ICD-10-CM

## 2022-03-11 DIAGNOSIS — N20.1 CALCULUS OF URETER: Primary | ICD-10-CM

## 2022-03-11 DIAGNOSIS — Z12.4 CERVICAL CANCER SCREENING: ICD-10-CM

## 2022-03-11 DIAGNOSIS — N20.1 CALCULUS OF URETER: ICD-10-CM

## 2022-03-11 DIAGNOSIS — N95.0 POSTMENOPAUSAL VAGINAL BLEEDING: Primary | ICD-10-CM

## 2022-03-11 LAB
ALBUMIN SERPL-MCNC: 4 G/DL (ref 3.5–5.2)
ALBUMIN/GLOB SERPL: 1.1 G/DL
ALP SERPL-CCNC: 131 U/L (ref 39–117)
ALT SERPL W P-5'-P-CCNC: 13 U/L (ref 1–33)
ANION GAP SERPL CALCULATED.3IONS-SCNC: 11.7 MMOL/L (ref 5–15)
AST SERPL-CCNC: 15 U/L (ref 1–32)
BASOPHILS # BLD AUTO: 0.07 10*3/MM3 (ref 0–0.2)
BASOPHILS NFR BLD AUTO: 0.6 % (ref 0–1.5)
BILIRUB SERPL-MCNC: 0.4 MG/DL (ref 0–1.2)
BUN SERPL-MCNC: 12 MG/DL (ref 6–20)
BUN/CREAT SERPL: 10.2 (ref 7–25)
CALCIUM SPEC-SCNC: 9.4 MG/DL (ref 8.6–10.5)
CHLORIDE SERPL-SCNC: 103 MMOL/L (ref 98–107)
CO2 SERPL-SCNC: 23.3 MMOL/L (ref 22–29)
CREAT SERPL-MCNC: 1.18 MG/DL (ref 0.57–1)
DEPRECATED RDW RBC AUTO: 44.3 FL (ref 37–54)
EGFRCR SERPLBLD CKD-EPI 2021: 56 ML/MIN/1.73
EOSINOPHIL # BLD AUTO: 0.12 10*3/MM3 (ref 0–0.4)
EOSINOPHIL NFR BLD AUTO: 1 % (ref 0.3–6.2)
ERYTHROCYTE [DISTWIDTH] IN BLOOD BY AUTOMATED COUNT: 14.1 % (ref 12.3–15.4)
GLOBULIN UR ELPH-MCNC: 3.5 GM/DL
GLUCOSE SERPL-MCNC: 113 MG/DL (ref 65–99)
HCT VFR BLD AUTO: 45.2 % (ref 34–46.6)
HGB BLD-MCNC: 14.7 G/DL (ref 12–15.9)
IMM GRANULOCYTES # BLD AUTO: 0.05 10*3/MM3 (ref 0–0.05)
IMM GRANULOCYTES NFR BLD AUTO: 0.4 % (ref 0–0.5)
LYMPHOCYTES # BLD AUTO: 2.26 10*3/MM3 (ref 0.7–3.1)
LYMPHOCYTES NFR BLD AUTO: 19 % (ref 19.6–45.3)
MCH RBC QN AUTO: 28.1 PG (ref 26.6–33)
MCHC RBC AUTO-ENTMCNC: 32.5 G/DL (ref 31.5–35.7)
MCV RBC AUTO: 86.4 FL (ref 79–97)
MONOCYTES # BLD AUTO: 0.94 10*3/MM3 (ref 0.1–0.9)
MONOCYTES NFR BLD AUTO: 7.9 % (ref 5–12)
NEUTROPHILS NFR BLD AUTO: 71.1 % (ref 42.7–76)
NEUTROPHILS NFR BLD AUTO: 8.46 10*3/MM3 (ref 1.7–7)
NRBC BLD AUTO-RTO: 0 /100 WBC (ref 0–0.2)
PLATELET # BLD AUTO: 382 10*3/MM3 (ref 140–450)
PMV BLD AUTO: 9.9 FL (ref 6–12)
POTASSIUM SERPL-SCNC: 4.2 MMOL/L (ref 3.5–5.2)
PROT SERPL-MCNC: 7.5 G/DL (ref 6–8.5)
RBC # BLD AUTO: 5.23 10*6/MM3 (ref 3.77–5.28)
SODIUM SERPL-SCNC: 138 MMOL/L (ref 136–145)
TSH SERPL DL<=0.05 MIU/L-ACNC: 1.39 UIU/ML (ref 0.27–4.2)
WBC NRBC COR # BLD: 11.9 10*3/MM3 (ref 3.4–10.8)

## 2022-03-11 PROCEDURE — 36415 COLL VENOUS BLD VENIPUNCTURE: CPT | Performed by: FAMILY MEDICINE

## 2022-03-11 PROCEDURE — 99214 OFFICE O/P EST MOD 30 MIN: CPT | Performed by: FAMILY MEDICINE

## 2022-03-11 PROCEDURE — 84443 ASSAY THYROID STIM HORMONE: CPT | Performed by: FAMILY MEDICINE

## 2022-03-11 PROCEDURE — 85025 COMPLETE CBC W/AUTO DIFF WBC: CPT

## 2022-03-11 PROCEDURE — 93010 ELECTROCARDIOGRAM REPORT: CPT | Performed by: INTERNAL MEDICINE

## 2022-03-11 PROCEDURE — 93005 ELECTROCARDIOGRAM TRACING: CPT | Performed by: UROLOGY

## 2022-03-11 PROCEDURE — 80053 COMPREHEN METABOLIC PANEL: CPT | Performed by: FAMILY MEDICINE

## 2022-03-11 RX ORDER — CEPHALEXIN 500 MG/1
CAPSULE ORAL
COMMUNITY
Start: 2022-03-10

## 2022-03-11 RX ORDER — ONDANSETRON HYDROCHLORIDE 8 MG/1
TABLET, FILM COATED ORAL
COMMUNITY
Start: 2022-03-10

## 2022-03-11 RX ORDER — TAMSULOSIN HYDROCHLORIDE 0.4 MG/1
CAPSULE ORAL
COMMUNITY
Start: 2022-03-10

## 2022-03-11 RX ORDER — HYDROCODONE BITARTRATE AND ACETAMINOPHEN 5; 325 MG/1; MG/1
TABLET ORAL
COMMUNITY
Start: 2022-03-10

## 2022-03-11 NOTE — PROGRESS NOTES
Subjective   Chief Complaint   Patient presents with   • Vaginal Bleeding     Started on 3/8/2022     Claire Moran is a 52 y.o. female.     Patient Care Team:  Ophelia Benson MD as PCP - General (Family Medicine)  Quinn Allen MD as Consulting Physician (Nephrology)  Oz Bass MD (Otolaryngology)    She is coming in today due to vaginal bleeding.  She tells me that her LMP was about 2 years ago.  In the morning on 3/8/2022 she noted some blood in her underwear after she had a bowel movement and subsequently urinated.  It was pretty mild and she was not even sure at that time whether it was coming from the urine or actually from the vagina.  She already is followed by urologist due to kidney stones and she recently had a CT of the abdomen and pelvis done.  The bleeding started getting more heavy and she even started passing some clots.  She also has some cramping in the uterine area and she feels like she simply has the regular period.  She needs to change to pad about every 4 hours.       The following portions of the patient's history were reviewed and updated as appropriate: allergies, current medications, past family history, past medical history, past social history, past surgical history and problem list.  Past Medical History:   Diagnosis Date   • Allergic rhinitis    • Anemia    • Kidney stone    • Psoriasis    • RTA (renal tubular acidosis)      Past Surgical History:   Procedure Laterality Date   • DILATATION AND CURETTAGE     • FOOT SURGERY     • PAROTID BIOPSY/TUMOR EXCISION  04/2021    [place] tumor     The patient has a family history of  Family History   Adopted: Yes   Family history unknown: Yes     Social History     Socioeconomic History   • Marital status:    • Number of children: 3   Tobacco Use   • Smoking status: Current Every Day Smoker     Packs/day: 0.50     Years: 35.00     Pack years: 17.50   • Smokeless tobacco: Never Used   Substance and Sexual Activity   • Alcohol  "use: Yes   • Drug use: Never   • Sexual activity: Defer       Review of Systems   Genitourinary: Positive for vaginal bleeding. Negative for difficulty urinating, dysuria, flank pain, frequency, vaginal discharge and vaginal pain.     Visit Vitals  /86 (BP Location: Left arm, Patient Position: Sitting, Cuff Size: Adult)   Pulse 87   Temp 97.8 °F (36.6 °C)   Resp 16   Ht 170.2 cm (67.01\")   Wt 101 kg (221 lb 12.8 oz)   SpO2 95%   BMI 34.73 kg/m²       Current Outpatient Medications:   •  cephalexin (KEFLEX) 500 MG capsule, , Disp: , Rfl:   •  clobetasol (TEMOVATE) 0.05 % ointment, , Disp: , Rfl:   •  HYDROcodone-acetaminophen (NORCO) 5-325 MG per tablet, , Disp: , Rfl:   •  ondansetron (ZOFRAN) 8 MG tablet, , Disp: , Rfl:   •  potassium chloride (KLOR-CON) 20 MEQ CR tablet, KLOR-CON M20 20 MEQ CR-TABS, Disp: , Rfl:   •  spironolactone (ALDACTONE) 25 MG tablet, Take 1 tablet by mouth Daily., Disp: 90 tablet, Rfl: 1  •  tamsulosin (FLOMAX) 0.4 MG capsule 24 hr capsule, , Disp: , Rfl:   No current facility-administered medications for this visit.    Objective   Physical Exam  Constitutional:       General: She is not in acute distress.     Appearance: Normal appearance. She is well-developed. She is not ill-appearing or diaphoretic.      Comments: Patient is in no distress, patient has normal voice and speech.  Normal respiratory effort.   HENT:      Head: Normocephalic and atraumatic.   Pulmonary:      Effort: Pulmonary effort is normal.   Genitourinary:     Comments: There is fresh blood noted in the vaginal vault and there is also couple of small blood clots.  Cervical exam does not show any abnormality.  There is no cervical motion tenderness, no mass palpated.  Musculoskeletal:      Cervical back: Normal range of motion and neck supple.   Neurological:      General: No focal deficit present.      Mental Status: She is alert and oriented to person, place, and time. Mental status is at baseline.   Psychiatric:  "        Mood and Affect: Mood normal.         CT Abdomen Pelvis Without Contrast    Result Date: 3/9/2022  Impression: 1. Multiple stacked stones within the distal right ureter measuring up to 6 mm in size with associated severe right hydronephrosis. 2. Additional bilateral nonobstructing renal stones.  Call/fax report was requested from the radiology  staff to confirm receipt of the report.    Electronically Signed By-Vivek Noguera MD On:3/9/2022 9:58 AM This report was finalized on [id number] by  Vivek Noguera MD.      Assessment/Plan   Diagnoses and all orders for this visit:    1. Postmenopausal vaginal bleeding (Primary)  -     Cancel: CBC Auto Differential  -     TSH  -     Comprehensive Metabolic Panel  -     US Non-ob Transvaginal; Future  -     Ambulatory Referral to Gynecology  -     CBC Auto Differential    2. Cervical cancer screening  -     IGP,Aptima HPV,Age Gdln; Future  -     IGP,Aptima HPV,Age Gdln    3. Hydronephrosis, unspecified hydronephrosis type      Patient is presenting today with postmenopausal vaginal bleeding which is now present for about 4 days.  I reviewed her records and her last Pap smear was in 6/2020 and it was negative.  I will be getting blood work and ordering transvaginal ultrasound.  Patient will need to see a gynecologist for endometrial biopsy or possible D&C.  I discussed with the patient that further work-up and evaluation is necessary to rule out endometrial abnormality like endometrial hyperplasia or possible endometrial cancer.  All questions were answered today to my best knowledge and patient satisfaction.  Patient was also recently evaluated by kidney specialist and she has obstructing stone in the left ureter with severe hydronephrosis.  She is in the process of scheduling a procedure for the stone removal through her urologist.        Return if symptoms worsen or fail to improve, for Recheck.    Requested Prescriptions      No prescriptions  requested or ordered in this encounter

## 2022-03-12 ENCOUNTER — HOSPITAL ENCOUNTER (OUTPATIENT)
Dept: ULTRASOUND IMAGING | Facility: HOSPITAL | Age: 52
Discharge: HOME | End: 2022-03-12
Admitting: FAMILY MEDICINE

## 2022-03-12 DIAGNOSIS — N95.0 POSTMENOPAUSAL VAGINAL BLEEDING: ICD-10-CM

## 2022-03-12 PROCEDURE — 93976 VASCULAR STUDY: CPT

## 2022-03-12 PROCEDURE — 76830 TRANSVAGINAL US NON-OB: CPT

## 2022-03-14 NOTE — PROGRESS NOTES
I called and spoke with patient and she is aware of her results. She sees the gynecologist tomorrow.

## 2022-03-14 NOTE — PROGRESS NOTES
I called patient and informed her of the results. She will keep her appt for tomorrow with the gynecologist.

## 2022-03-16 LAB — QT INTERVAL: 400 MS

## 2022-03-17 LAB
AGE GDLN ACOG TESTING: NORMAL
CYTOLOGIST CVX/VAG CYTO: NORMAL
CYTOLOGY CVX/VAG DOC CYTO: NORMAL
CYTOLOGY CVX/VAG DOC THIN PREP: NORMAL
DX ICD CODE: NORMAL
HIV 1 & 2 AB SER-IMP: NORMAL
HPV I/H RISK 4 DNA CVX QL PROBE+SIG AMP: NEGATIVE
OTHER STN SPEC: NORMAL
STAT OF ADQ CVX/VAG CYTO-IMP: NORMAL

## 2022-03-17 NOTE — PROGRESS NOTES
I spoke with patient and gave results. She saw GYN today and had a biopsy done. She was told by the GYN they will call her in 72 hours with results. Patient asked them to send us results

## 2022-03-22 ENCOUNTER — LAB (OUTPATIENT)
Dept: FAMILY MEDICINE CLINIC | Facility: CLINIC | Age: 52
End: 2022-03-22

## 2022-03-22 DIAGNOSIS — N18.9 CHRONIC KIDNEY DISEASE, UNSPECIFIED CKD STAGE: Primary | ICD-10-CM

## 2022-03-22 LAB
25(OH)D3 SERPL-MCNC: 30.5 NG/ML (ref 30–100)
ALBUMIN SERPL-MCNC: 3.6 G/DL (ref 3.5–5.2)
ANION GAP SERPL CALCULATED.3IONS-SCNC: 8.4 MMOL/L (ref 5–15)
BUN SERPL-MCNC: 17 MG/DL (ref 6–20)
BUN/CREAT SERPL: 13.5 (ref 7–25)
CALCIUM SPEC-SCNC: 9.2 MG/DL (ref 8.6–10.5)
CHLORIDE SERPL-SCNC: 105 MMOL/L (ref 98–107)
CO2 SERPL-SCNC: 25.6 MMOL/L (ref 22–29)
CREAT SERPL-MCNC: 1.26 MG/DL (ref 0.57–1)
EGFRCR SERPLBLD CKD-EPI 2021: 51.8 ML/MIN/1.73
GLUCOSE SERPL-MCNC: 118 MG/DL (ref 65–99)
PHOSPHATE SERPL-MCNC: 3 MG/DL (ref 2.5–4.5)
POTASSIUM SERPL-SCNC: 4.1 MMOL/L (ref 3.5–5.2)
PTH-INTACT SERPL-MCNC: 38.1 PG/ML (ref 15–65)
SODIUM SERPL-SCNC: 139 MMOL/L (ref 136–145)
URATE SERPL-MCNC: 6.4 MG/DL (ref 2.4–5.7)

## 2022-03-22 PROCEDURE — 36415 COLL VENOUS BLD VENIPUNCTURE: CPT

## 2022-03-22 PROCEDURE — 84550 ASSAY OF BLOOD/URIC ACID: CPT | Performed by: INTERNAL MEDICINE

## 2022-03-22 PROCEDURE — 80069 RENAL FUNCTION PANEL: CPT | Performed by: INTERNAL MEDICINE

## 2022-03-22 PROCEDURE — 82306 VITAMIN D 25 HYDROXY: CPT | Performed by: INTERNAL MEDICINE

## 2022-03-22 PROCEDURE — 83970 ASSAY OF PARATHORMONE: CPT | Performed by: INTERNAL MEDICINE

## 2022-06-01 ENCOUNTER — TRANSCRIBE ORDERS (OUTPATIENT)
Dept: ADMINISTRATIVE | Facility: HOSPITAL | Age: 52
End: 2022-06-01

## 2022-06-01 ENCOUNTER — LAB (OUTPATIENT)
Dept: LAB | Facility: HOSPITAL | Age: 52
End: 2022-06-01

## 2022-06-01 DIAGNOSIS — N18.9 CHRONIC KIDNEY DISEASE, UNSPECIFIED CKD STAGE: ICD-10-CM

## 2022-06-01 DIAGNOSIS — N18.9 CHRONIC KIDNEY DISEASE, UNSPECIFIED CKD STAGE: Primary | ICD-10-CM

## 2022-06-01 DIAGNOSIS — N20.0 URIC ACID NEPHROLITHIASIS: ICD-10-CM

## 2022-06-01 DIAGNOSIS — E87.6 HYPOPOTASSEMIA: ICD-10-CM

## 2022-06-01 LAB
25(OH)D3 SERPL-MCNC: 27 NG/ML (ref 30–100)
ALBUMIN SERPL-MCNC: 3.8 G/DL (ref 3.5–5.2)
ANION GAP SERPL CALCULATED.3IONS-SCNC: 11 MMOL/L (ref 5–15)
BUN SERPL-MCNC: 17 MG/DL (ref 6–20)
BUN/CREAT SERPL: 16 (ref 7–25)
CALCIUM SPEC-SCNC: 9.2 MG/DL (ref 8.6–10.5)
CHLORIDE SERPL-SCNC: 108 MMOL/L (ref 98–107)
CO2 SERPL-SCNC: 21 MMOL/L (ref 22–29)
CREAT SERPL-MCNC: 1.06 MG/DL (ref 0.57–1)
EGFRCR SERPLBLD CKD-EPI 2021: 63.7 ML/MIN/1.73
GLUCOSE SERPL-MCNC: 113 MG/DL (ref 65–99)
PHOSPHATE SERPL-MCNC: 3.8 MG/DL (ref 2.5–4.5)
POTASSIUM SERPL-SCNC: 3.9 MMOL/L (ref 3.5–5.2)
PTH-INTACT SERPL-MCNC: 25.1 PG/ML (ref 15–65)
SODIUM SERPL-SCNC: 140 MMOL/L (ref 136–145)
URATE SERPL-MCNC: 6.4 MG/DL (ref 2.4–5.7)

## 2022-06-01 PROCEDURE — 82306 VITAMIN D 25 HYDROXY: CPT

## 2022-06-01 PROCEDURE — 36415 COLL VENOUS BLD VENIPUNCTURE: CPT

## 2022-06-01 PROCEDURE — 84550 ASSAY OF BLOOD/URIC ACID: CPT

## 2022-06-01 PROCEDURE — 80069 RENAL FUNCTION PANEL: CPT

## 2022-06-01 PROCEDURE — 83970 ASSAY OF PARATHORMONE: CPT

## 2022-10-11 ENCOUNTER — TELEPHONE (OUTPATIENT)
Dept: FAMILY MEDICINE CLINIC | Facility: CLINIC | Age: 52
End: 2022-10-11

## 2022-10-11 NOTE — TELEPHONE ENCOUNTER
[facility], [facility], CALLED AND THEY NEED THE FOLLOWING RECENT LABS FAXED TO THEM.     [facility], [facility]   FAX [phone]

## 2022-12-02 ENCOUNTER — LAB (OUTPATIENT)
Dept: LAB | Facility: HOSPITAL | Age: 52
End: 2022-12-02

## 2022-12-02 ENCOUNTER — TRANSCRIBE ORDERS (OUTPATIENT)
Dept: ADMINISTRATIVE | Facility: HOSPITAL | Age: 52
End: 2022-12-02

## 2022-12-02 DIAGNOSIS — E79.0 URICACIDEMIA: ICD-10-CM

## 2022-12-02 DIAGNOSIS — N20.0 URIC ACID NEPHROLITHIASIS: ICD-10-CM

## 2022-12-02 DIAGNOSIS — N18.2 CHRONIC KIDNEY DISEASE, STAGE II (MILD): Primary | ICD-10-CM

## 2022-12-02 DIAGNOSIS — N18.2 CHRONIC KIDNEY DISEASE, STAGE II (MILD): ICD-10-CM

## 2022-12-02 LAB
ALBUMIN SERPL-MCNC: 3.9 G/DL (ref 3.5–5.2)
ANION GAP SERPL CALCULATED.3IONS-SCNC: 7.2 MMOL/L (ref 5–15)
BUN SERPL-MCNC: 14 MG/DL (ref 6–20)
BUN/CREAT SERPL: 11.5 (ref 7–25)
CALCIUM SPEC-SCNC: 9.5 MG/DL (ref 8.6–10.5)
CHLORIDE SERPL-SCNC: 103 MMOL/L (ref 98–107)
CO2 SERPL-SCNC: 26.8 MMOL/L (ref 22–29)
CREAT SERPL-MCNC: 1.22 MG/DL (ref 0.57–1)
EGFRCR SERPLBLD CKD-EPI 2021: 53.8 ML/MIN/1.73
GLUCOSE SERPL-MCNC: 140 MG/DL (ref 65–99)
PHOSPHATE SERPL-MCNC: 3.1 MG/DL (ref 2.5–4.5)
POTASSIUM SERPL-SCNC: 4 MMOL/L (ref 3.5–5.2)
SODIUM SERPL-SCNC: 137 MMOL/L (ref 136–145)
URATE SERPL-MCNC: 3.8 MG/DL (ref 2.4–5.7)

## 2022-12-02 PROCEDURE — 36415 COLL VENOUS BLD VENIPUNCTURE: CPT

## 2022-12-02 PROCEDURE — 80069 RENAL FUNCTION PANEL: CPT

## 2022-12-02 PROCEDURE — 84550 ASSAY OF BLOOD/URIC ACID: CPT

## 2023-08-01 ENCOUNTER — TRANSCRIBE ORDERS (OUTPATIENT)
Dept: ADMINISTRATIVE | Facility: HOSPITAL | Age: 53
End: 2023-08-01
Payer: COMMERCIAL

## 2023-08-01 ENCOUNTER — LAB (OUTPATIENT)
Dept: LAB | Facility: HOSPITAL | Age: 53
End: 2023-08-01
Payer: COMMERCIAL

## 2023-08-01 DIAGNOSIS — N20.0 URIC ACID NEPHROLITHIASIS: ICD-10-CM

## 2023-08-01 DIAGNOSIS — N18.31 CHRONIC KIDNEY DISEASE (CKD) STAGE G3A/A1, MODERATELY DECREASED GLOMERULAR FILTRATION RATE (GFR) BETWEEN 45-59 ML/MIN/1.73 SQUARE METER AND ALBUMINURIA CREATININE RATIO LESS THAN 30 MG/G (CMS/H*: ICD-10-CM

## 2023-08-01 DIAGNOSIS — E79.0 URICACIDEMIA: ICD-10-CM

## 2023-08-01 DIAGNOSIS — N20.0 URIC ACID NEPHROLITHIASIS: Primary | ICD-10-CM

## 2023-08-01 LAB
ALBUMIN SERPL-MCNC: 3.7 G/DL (ref 3.5–5.2)
ANION GAP SERPL CALCULATED.3IONS-SCNC: 11.9 MMOL/L (ref 5–15)
BUN SERPL-MCNC: 21 MG/DL (ref 6–20)
BUN/CREAT SERPL: 19.6 (ref 7–25)
CALCIUM SPEC-SCNC: 9.8 MG/DL (ref 8.6–10.5)
CHLORIDE SERPL-SCNC: 107 MMOL/L (ref 98–107)
CO2 SERPL-SCNC: 23.1 MMOL/L (ref 22–29)
CREAT SERPL-MCNC: 1.07 MG/DL (ref 0.57–1)
EGFRCR SERPLBLD CKD-EPI 2021: 62.6 ML/MIN/1.73
GLUCOSE SERPL-MCNC: 117 MG/DL (ref 65–99)
PHOSPHATE SERPL-MCNC: 2.7 MG/DL (ref 2.5–4.5)
POTASSIUM SERPL-SCNC: 4.4 MMOL/L (ref 3.5–5.2)
SODIUM SERPL-SCNC: 142 MMOL/L (ref 136–145)

## 2023-08-01 PROCEDURE — 80069 RENAL FUNCTION PANEL: CPT

## 2023-08-01 PROCEDURE — 36415 COLL VENOUS BLD VENIPUNCTURE: CPT

## 2024-07-17 ENCOUNTER — TRANSCRIBE ORDERS (OUTPATIENT)
Dept: ADMINISTRATIVE | Facility: HOSPITAL | Age: 54
End: 2024-07-17
Payer: COMMERCIAL

## 2024-07-17 ENCOUNTER — LAB (OUTPATIENT)
Dept: LAB | Facility: HOSPITAL | Age: 54
End: 2024-07-17
Payer: COMMERCIAL

## 2024-07-17 DIAGNOSIS — N20.0 URIC ACID NEPHROLITHIASIS: Primary | ICD-10-CM

## 2024-07-17 DIAGNOSIS — N18.2 CHRONIC KIDNEY DISEASE, STAGE II (MILD): ICD-10-CM

## 2024-07-17 DIAGNOSIS — E79.0 URICACIDEMIA: ICD-10-CM

## 2024-07-17 DIAGNOSIS — N20.0 URIC ACID NEPHROLITHIASIS: ICD-10-CM

## 2024-07-17 LAB
ALBUMIN SERPL-MCNC: 3.8 G/DL (ref 3.5–5.2)
ANION GAP SERPL CALCULATED.3IONS-SCNC: 11.8 MMOL/L (ref 5–15)
BACTERIA UR QL AUTO: ABNORMAL /HPF
BILIRUB UR QL STRIP: NEGATIVE
BUN SERPL-MCNC: 13 MG/DL (ref 6–20)
BUN/CREAT SERPL: 12.3 (ref 7–25)
CALCIUM SPEC-SCNC: 9.1 MG/DL (ref 8.6–10.5)
CHLORIDE SERPL-SCNC: 103 MMOL/L (ref 98–107)
CLARITY UR: ABNORMAL
CO2 SERPL-SCNC: 23.2 MMOL/L (ref 22–29)
COLOR UR: YELLOW
CREAT SERPL-MCNC: 1.06 MG/DL (ref 0.57–1)
EGFRCR SERPLBLD CKD-EPI 2021: 62.9 ML/MIN/1.73
GLUCOSE SERPL-MCNC: 170 MG/DL (ref 65–99)
GLUCOSE UR STRIP-MCNC: NEGATIVE MG/DL
HGB UR QL STRIP.AUTO: NEGATIVE
HYALINE CASTS UR QL AUTO: ABNORMAL /LPF
KETONES UR QL STRIP: NEGATIVE
LEUKOCYTE ESTERASE UR QL STRIP.AUTO: ABNORMAL
NITRITE UR QL STRIP: NEGATIVE
PH UR STRIP.AUTO: 7 [PH] (ref 5–8)
PHOSPHATE SERPL-MCNC: 3.4 MG/DL (ref 2.5–4.5)
POTASSIUM SERPL-SCNC: 4 MMOL/L (ref 3.5–5.2)
PROT UR QL STRIP: ABNORMAL
RBC # UR STRIP: ABNORMAL /HPF
REF LAB TEST METHOD: ABNORMAL
SODIUM SERPL-SCNC: 138 MMOL/L (ref 136–145)
SP GR UR STRIP: 1.01 (ref 1–1.03)
SQUAMOUS #/AREA URNS HPF: ABNORMAL /HPF
URATE SERPL-MCNC: 4.2 MG/DL (ref 2.4–5.7)
UROBILINOGEN UR QL STRIP: ABNORMAL
WBC # UR STRIP: ABNORMAL /HPF

## 2024-07-17 PROCEDURE — 36415 COLL VENOUS BLD VENIPUNCTURE: CPT

## 2024-07-17 PROCEDURE — 87086 URINE CULTURE/COLONY COUNT: CPT

## 2024-07-17 PROCEDURE — 80069 RENAL FUNCTION PANEL: CPT

## 2024-07-17 PROCEDURE — 84550 ASSAY OF BLOOD/URIC ACID: CPT

## 2024-07-17 PROCEDURE — 81001 URINALYSIS AUTO W/SCOPE: CPT

## 2024-07-18 LAB — BACTERIA SPEC AEROBE CULT: NO GROWTH

## 2024-07-26 ENCOUNTER — OFFICE VISIT (OUTPATIENT)
Dept: FAMILY MEDICINE CLINIC | Facility: CLINIC | Age: 54
End: 2024-07-26
Payer: COMMERCIAL

## 2024-07-26 ENCOUNTER — HOSPITAL ENCOUNTER (OUTPATIENT)
Dept: CARDIOLOGY | Facility: HOSPITAL | Age: 54
Discharge: HOME | End: 2024-07-26
Admitting: FAMILY MEDICINE
Payer: COMMERCIAL

## 2024-07-26 VITALS
TEMPERATURE: 98 F | WEIGHT: 221 LBS | HEIGHT: 67 IN | BODY MASS INDEX: 34.69 KG/M2 | SYSTOLIC BLOOD PRESSURE: 108 MMHG | DIASTOLIC BLOOD PRESSURE: 72 MMHG | HEART RATE: 84 BPM

## 2024-07-26 DIAGNOSIS — R07.9 CHEST PAIN, UNSPECIFIED TYPE: ICD-10-CM

## 2024-07-26 DIAGNOSIS — Z12.31 VISIT FOR SCREENING MAMMOGRAM: ICD-10-CM

## 2024-07-26 DIAGNOSIS — M79.89 RIGHT LEG SWELLING: ICD-10-CM

## 2024-07-26 DIAGNOSIS — Z00.00 PREVENTATIVE HEALTH CARE: Primary | ICD-10-CM

## 2024-07-26 PROBLEM — R22.1 MASS IN NECK: Status: RESOLVED | Noted: 2021-01-11 | Resolved: 2024-07-26

## 2024-07-26 PROBLEM — N95.0 POSTMENOPAUSAL VAGINAL BLEEDING: Status: RESOLVED | Noted: 2022-03-11 | Resolved: 2024-07-26

## 2024-07-26 LAB
BH CV LOWER VASCULAR LEFT COMMON FEMORAL AUGMENT: NORMAL
BH CV LOWER VASCULAR LEFT COMMON FEMORAL COMPETENT: NORMAL
BH CV LOWER VASCULAR LEFT COMMON FEMORAL COMPRESS: NORMAL
BH CV LOWER VASCULAR LEFT COMMON FEMORAL PHASIC: NORMAL
BH CV LOWER VASCULAR LEFT COMMON FEMORAL SPONT: NORMAL
BH CV LOWER VASCULAR RIGHT COMMON FEMORAL AUGMENT: NORMAL
BH CV LOWER VASCULAR RIGHT COMMON FEMORAL COMPETENT: NORMAL
BH CV LOWER VASCULAR RIGHT COMMON FEMORAL COMPRESS: NORMAL
BH CV LOWER VASCULAR RIGHT COMMON FEMORAL PHASIC: NORMAL
BH CV LOWER VASCULAR RIGHT COMMON FEMORAL SPONT: NORMAL
BH CV LOWER VASCULAR RIGHT DISTAL FEMORAL COMPRESS: NORMAL
BH CV LOWER VASCULAR RIGHT GASTRONEMIUS COMPRESS: NORMAL
BH CV LOWER VASCULAR RIGHT GREATER SAPH AK COMPRESS: NORMAL
BH CV LOWER VASCULAR RIGHT GREATER SAPH BK COMPRESS: NORMAL
BH CV LOWER VASCULAR RIGHT LESSER SAPH COMPRESS: NORMAL
BH CV LOWER VASCULAR RIGHT MID FEMORAL AUGMENT: NORMAL
BH CV LOWER VASCULAR RIGHT MID FEMORAL COMPETENT: NORMAL
BH CV LOWER VASCULAR RIGHT MID FEMORAL COMPRESS: NORMAL
BH CV LOWER VASCULAR RIGHT MID FEMORAL PHASIC: NORMAL
BH CV LOWER VASCULAR RIGHT MID FEMORAL SPONT: NORMAL
BH CV LOWER VASCULAR RIGHT PERONEAL COMPRESS: NORMAL
BH CV LOWER VASCULAR RIGHT POPLITEAL AUGMENT: NORMAL
BH CV LOWER VASCULAR RIGHT POPLITEAL COMPETENT: NORMAL
BH CV LOWER VASCULAR RIGHT POPLITEAL COMPRESS: NORMAL
BH CV LOWER VASCULAR RIGHT POPLITEAL PHASIC: NORMAL
BH CV LOWER VASCULAR RIGHT POPLITEAL SPONT: NORMAL
BH CV LOWER VASCULAR RIGHT POSTERIOR TIBIAL COMPRESS: NORMAL
BH CV LOWER VASCULAR RIGHT PROXIMAL FEMORAL COMPRESS: NORMAL
BH CV LOWER VASCULAR RIGHT SAPHENOFEMORAL JUNCTION COMPRESS: NORMAL
BH CV VAS PRELIMINARY FINDINGS SCRIPTING: 1

## 2024-07-26 PROCEDURE — 93971 EXTREMITY STUDY: CPT

## 2024-07-26 RX ORDER — ALLOPURINOL 100 MG/1
100 TABLET ORAL DAILY
COMMUNITY
End: 2024-07-26

## 2024-07-26 RX ORDER — ALLOPURINOL 300 MG/1
300 TABLET ORAL DAILY
COMMUNITY

## 2024-07-26 RX ORDER — POTASSIUM CITRATE 15 MEQ/1
1 TABLET, EXTENDED RELEASE ORAL 2 TIMES DAILY
COMMUNITY

## 2024-07-26 NOTE — PROGRESS NOTES
Subjective   Chief Complaint   Patient presents with    Annual Exam    Chest Pain    Foot Swelling    GI Problem     Claire Moran is a 54 y.o. female.     Patient Care Team:  Ophelia Benson MD as PCP - General (Family Medicine)  Quinn Allen MD as Consulting Physician (Nephrology)  Aneta Marcos MD (Otolaryngology)  Lico Jack MD as Consulting Physician (Urology)    History of Present Illness  She is coming in today for her annual wellness exam.  Patient is also due for fasting blood work and she is overdue for her mammogram.  She reports that she had colonoscopy around the age of 46.  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  She is also under the care of urology due to kidney stones in the past.  While in the office today for her physical she reports several other complaints.  She reports that for the last 2 weeks or so she has not been feeling well and has had some diarrhea, but this however seems to be improving and today she had a solid bowel movement.  She also has been having some bloating feeling in her abdomen.  She also has been experiencing chest pain on the left side of her chest and at times radiating to her left shoulder.  This comes and goes and happens without any trigger.  It happens when she is sitting and watching TV and she somehow feels like she needs to reposition her body to get some relief or when she takes Pepto-Bismol or Tums that provide some relief.  It typically last for few hours and resolves.  Patient also tells me that for the last 3 weeks she has been experiencing some upper respiratory symptoms and pretty bad cough, that comes in spells and when she gets that spell she has shooting pains in the back of her neck and her head and at times it feels like she would blackout.  Even cough seems to be getting better.   She also has noted some fluid retention/swelling in her right foot, she does not feel that the left foot is affected.  Patient reports that she traveled through [place] about 3 weeks ago.       The following portions of the patient's history were reviewed and updated as appropriate: allergies, current medications, past family history, past medical history, past social history, past surgical history, and problem list.  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     The patient has a family history of  Family History   Adopted: Yes   Family history unknown: Yes     Social History     Socioeconomic History    Marital status:     Number of children: 3   Tobacco Use    Smoking status: Every Day     Current packs/day: 0.50     Average packs/day: 0.5 packs/day for 35.0 years (17.5 ttl pk-yrs)     Types: Cigarettes     Passive exposure: Current    Smokeless tobacco: Never   Vaping Use    Vaping status: Never Used   Substance and Sexual Activity    Alcohol use: Yes    Drug use: Never    Sexual activity: Defer       Review of Systems   Constitutional:  Negative for activity change, appetite change, chills, fatigue, fever, unexpected weight gain and unexpected weight loss.   HENT:  Negative for congestion, ear pain, hearing loss, nosebleeds, postnasal drip, swollen glands, tinnitus and trouble swallowing.    Eyes:  Negative for blurred vision, double vision and visual disturbance.   Respiratory:  Positive for cough. Negative for choking, chest tightness, shortness of breath, wheezing and stridor.    Cardiovascular:  Positive for leg swelling. Negative for chest pain and palpitations.   Gastrointestinal:  Positive for abdominal distention, abdominal pain and  "diarrhea. Negative for anal bleeding, constipation, nausea, vomiting and GERD.   Endocrine: Negative for cold intolerance, heat intolerance and polyphagia.   Genitourinary:  Negative for dysuria, flank pain, frequency, hematuria and urgency.   Musculoskeletal:  Negative for arthralgias, back pain, gait problem, joint swelling and neck pain.   Skin:  Negative for color change, dry skin and skin lesions.   Allergic/Immunologic: Negative for environmental allergies and food allergies.   Neurological:  Negative for dizziness, tremors, speech difficulty, light-headedness, numbness, headache and confusion.   Hematological:  Negative for adenopathy. Does not bruise/bleed easily.   Psychiatric/Behavioral:  Negative for decreased concentration, sleep disturbance, depressed mood and stress.      Visit Vitals  /72 (BP Location: Left arm, Patient Position: Sitting, Cuff Size: Adult)   Pulse 84   Temp 98 °F (36.7 °C) (Infrared)   Ht 170.2 cm (67.01\")   Wt 100 kg (221 lb)   BMI 34.61 kg/m²       BMI cannot be calculated due to outdated height or weight values.  Please input a current height/weight in Vitals and re-renter BMIFOLLOWUP in Note to pull in correct documentation based on BMI range.      Current Outpatient Medications:     potassium chloride (KLOR-CON) 20 MEQ CR tablet, KLOR-CON M20 20 MEQ CR-TABS, Disp: , Rfl:     allopurinol (ZYLOPRIM) 300 MG tablet, Take 1 tablet by mouth Daily., Disp: , Rfl:     HYDROcodone-acetaminophen (NORCO) 5-325 MG per tablet, , Disp: , Rfl:     Potassium Citrate ER 15 MEQ (1620 MG) tablet controlled-release, Take 1 tablet by mouth 2 (Two) Times a Day., Disp: , Rfl:     Objective   Physical Exam  Vitals and nursing note reviewed.   Constitutional:       General: She is not in acute distress.     Appearance: She is well-developed. She is not diaphoretic.   HENT:      Head: Normocephalic and atraumatic.      Right Ear: External ear normal.      Left Ear: External ear normal.   Eyes:      " General: No scleral icterus.        Right eye: No discharge.         Left eye: No discharge.      Conjunctiva/sclera: Conjunctivae normal.      Pupils: Pupils are equal, round, and reactive to light.   Neck:      Thyroid: No thyromegaly.      Vascular: No JVD.   Cardiovascular:      Rate and Rhythm: Normal rate and regular rhythm.      Heart sounds: Normal heart sounds. No murmur heard.     No friction rub. No gallop.   Pulmonary:      Effort: Pulmonary effort is normal. No respiratory distress.      Breath sounds: Normal breath sounds. No stridor. No wheezing, rhonchi or rales.   Abdominal:      General: Bowel sounds are normal. There is no distension.      Palpations: Abdomen is soft. There is no mass.      Tenderness: There is abdominal tenderness. There is no guarding or rebound.      Hernia: No hernia is present.      Comments: There are some palpation tenderness in epigastric area.   Musculoskeletal:         General: No swelling, tenderness or deformity. Normal range of motion.      Cervical back: Normal range of motion and neck supple. No muscular tenderness.      Right lower leg: No edema.      Left lower leg: No edema.      Comments: There is some mild fluid retention affecting primarily her right foot.  No calf palpation tenderness.   Lymphadenopathy:      Cervical: No cervical adenopathy.   Skin:     General: Skin is warm and dry.      Capillary Refill: Capillary refill takes less than 2 seconds.      Coloration: Skin is not pale.      Findings: No bruising, erythema, lesion or rash.   Neurological:      General: No focal deficit present.      Mental Status: She is alert and oriented to person, place, and time.      Cranial Nerves: No cranial nerve deficit.      Sensory: No sensory deficit.      Motor: No weakness.      Coordination: Coordination normal.      Deep Tendon Reflexes: Reflexes normal.   Psychiatric:         Mood and Affect: Mood normal.         Behavior: Behavior normal.         Judgment:  Judgment normal.         ECG 12 Lead    Date/Time: 7/26/2024 3:12 PM  Performed by: Ophelia Benson MD    Authorized by: Ophelia Benson MD  Comparison: not compared with previous ECG   Previous ECG: no previous ECG available  Rhythm: sinus rhythm  Rate: normal  Conduction: conduction normal  ST Segments: ST segments normal  T Waves: T waves normal  QRS axis: normal  Other findings: non-specific ST-T wave changes and left atrial abnormality    Clinical impression: non-specific ECG          Assessment & Plan   Diagnoses and all orders for this visit:    1. Preventative health care (Primary)  -     CBC Auto Differential  -     Comprehensive Metabolic Panel  -     Lipid Panel  -     TSH  -     Vitamin D,25-Hydroxy    2. Visit for screening mammogram  -     Mammo Screening Digital Tomosynthesis Bilateral With CAD; Future    3. Chest pain, unspecified type  -     XR Chest 2 View  -     BNP  -     ECG 12 Lead    4. Right leg swelling  -     Duplex Venous Lower Extremity - Right CAR; Future  -     BNP      Wellness exam was done today - see above for details. Healthy life style was reviewed and discussed and re-enforced. Regular exercise and healthy diet were also discussed and recommended.  I will be getting fasting blood work.  I also reviewed her health maintenance.  Her last mammogram was in 6/2020 and the new order was given and patient was advised to schedule the test.  Patient reports previously having a colonoscopy done and I will be getting a copy of that report from the GI office.  Her last Pap smear was in 3/2022.  Immunization was also reviewed and recommended.  I also addressed her multiple symptoms today.  EKG was done today and showed normal sinus rhythm.  I will be getting chest x-ray.  Stat venous Doppler ultrasound was also done and it was negative for DVT.  I will advise further once the rest of the results is available.        Return in about 1 year (around 7/26/2025) for Annual  physical.    Requested Prescriptions      No prescriptions requested or ordered in this encounter       Ophelia Benson MD  07/26/2024

## 2024-07-27 ENCOUNTER — HOSPITAL ENCOUNTER (OUTPATIENT)
Dept: GENERAL RADIOLOGY | Facility: HOSPITAL | Age: 54
Discharge: HOME | End: 2024-07-27
Payer: COMMERCIAL

## 2024-07-27 ENCOUNTER — LAB (OUTPATIENT)
Dept: LAB | Facility: HOSPITAL | Age: 54
End: 2024-07-27
Payer: COMMERCIAL

## 2024-07-27 LAB
25(OH)D3 SERPL-MCNC: 19.5 NG/ML (ref 30–100)
ALBUMIN SERPL-MCNC: 3.6 G/DL (ref 3.5–5.2)
ALBUMIN/GLOB SERPL: 1.2 G/DL
ALP SERPL-CCNC: 155 U/L (ref 39–117)
ALT SERPL W P-5'-P-CCNC: 29 U/L (ref 1–33)
ANION GAP SERPL CALCULATED.3IONS-SCNC: 10.1 MMOL/L (ref 5–15)
AST SERPL-CCNC: 32 U/L (ref 1–32)
BASOPHILS # BLD AUTO: 0.04 10*3/MM3 (ref 0–0.2)
BASOPHILS NFR BLD AUTO: 0.4 % (ref 0–1.5)
BILIRUB SERPL-MCNC: 0.3 MG/DL (ref 0–1.2)
BUN SERPL-MCNC: 11 MG/DL (ref 6–20)
BUN/CREAT SERPL: 11 (ref 7–25)
CALCIUM SPEC-SCNC: 9.1 MG/DL (ref 8.6–10.5)
CHLORIDE SERPL-SCNC: 104 MMOL/L (ref 98–107)
CHOLEST SERPL-MCNC: 163 MG/DL (ref 0–200)
CO2 SERPL-SCNC: 21.9 MMOL/L (ref 22–29)
CREAT SERPL-MCNC: 1 MG/DL (ref 0.57–1)
DEPRECATED RDW RBC AUTO: 53.2 FL (ref 37–54)
EGFRCR SERPLBLD CKD-EPI 2021: 67.5 ML/MIN/1.73
EOSINOPHIL # BLD AUTO: 0.11 10*3/MM3 (ref 0–0.4)
EOSINOPHIL NFR BLD AUTO: 1.1 % (ref 0.3–6.2)
ERYTHROCYTE [DISTWIDTH] IN BLOOD BY AUTOMATED COUNT: 16.1 % (ref 12.3–15.4)
GLOBULIN UR ELPH-MCNC: 3.1 GM/DL
GLUCOSE SERPL-MCNC: 131 MG/DL (ref 65–99)
HCT VFR BLD AUTO: 48.3 % (ref 34–46.6)
HDLC SERPL-MCNC: 35 MG/DL (ref 40–60)
HGB BLD-MCNC: 14.8 G/DL (ref 12–15.9)
IMM GRANULOCYTES # BLD AUTO: 0.05 10*3/MM3 (ref 0–0.05)
IMM GRANULOCYTES NFR BLD AUTO: 0.5 % (ref 0–0.5)
LDLC SERPL CALC-MCNC: 102 MG/DL (ref 0–100)
LDLC/HDLC SERPL: 2.83 {RATIO}
LYMPHOCYTES # BLD AUTO: 1.67 10*3/MM3 (ref 0.7–3.1)
LYMPHOCYTES NFR BLD AUTO: 17 % (ref 19.6–45.3)
MCH RBC QN AUTO: 27.6 PG (ref 26.6–33)
MCHC RBC AUTO-ENTMCNC: 30.6 G/DL (ref 31.5–35.7)
MCV RBC AUTO: 90.1 FL (ref 79–97)
MONOCYTES # BLD AUTO: 0.8 10*3/MM3 (ref 0.1–0.9)
MONOCYTES NFR BLD AUTO: 8.1 % (ref 5–12)
NEUTROPHILS NFR BLD AUTO: 7.17 10*3/MM3 (ref 1.7–7)
NEUTROPHILS NFR BLD AUTO: 72.9 % (ref 42.7–76)
NRBC BLD AUTO-RTO: 0 /100 WBC (ref 0–0.2)
NT-PROBNP SERPL-MCNC: 179 PG/ML (ref 0–900)
PLATELET # BLD AUTO: 351 10*3/MM3 (ref 140–450)
PMV BLD AUTO: 9.6 FL (ref 6–12)
POTASSIUM SERPL-SCNC: 4.1 MMOL/L (ref 3.5–5.2)
PROT SERPL-MCNC: 6.7 G/DL (ref 6–8.5)
RBC # BLD AUTO: 5.36 10*6/MM3 (ref 3.77–5.28)
SODIUM SERPL-SCNC: 136 MMOL/L (ref 136–145)
TRIGL SERPL-MCNC: 144 MG/DL (ref 0–150)
TSH SERPL DL<=0.05 MIU/L-ACNC: 1.39 UIU/ML (ref 0.27–4.2)
VLDLC SERPL-MCNC: 26 MG/DL (ref 5–40)
WBC NRBC COR # BLD AUTO: 9.84 10*3/MM3 (ref 3.4–10.8)

## 2024-07-27 PROCEDURE — 83880 ASSAY OF NATRIURETIC PEPTIDE: CPT | Performed by: FAMILY MEDICINE

## 2024-07-27 PROCEDURE — 82306 VITAMIN D 25 HYDROXY: CPT | Performed by: FAMILY MEDICINE

## 2024-07-27 PROCEDURE — 80053 COMPREHEN METABOLIC PANEL: CPT | Performed by: FAMILY MEDICINE

## 2024-07-27 PROCEDURE — 84466 ASSAY OF TRANSFERRIN: CPT | Performed by: FAMILY MEDICINE

## 2024-07-27 PROCEDURE — 83540 ASSAY OF IRON: CPT | Performed by: FAMILY MEDICINE

## 2024-07-27 PROCEDURE — 85025 COMPLETE CBC W/AUTO DIFF WBC: CPT | Performed by: FAMILY MEDICINE

## 2024-07-27 PROCEDURE — 80061 LIPID PANEL: CPT | Performed by: FAMILY MEDICINE

## 2024-07-27 PROCEDURE — 36415 COLL VENOUS BLD VENIPUNCTURE: CPT | Performed by: FAMILY MEDICINE

## 2024-07-27 PROCEDURE — 84443 ASSAY THYROID STIM HORMONE: CPT | Performed by: FAMILY MEDICINE

## 2024-07-27 PROCEDURE — 82728 ASSAY OF FERRITIN: CPT | Performed by: FAMILY MEDICINE

## 2024-07-27 PROCEDURE — 71046 X-RAY EXAM CHEST 2 VIEWS: CPT

## 2024-07-28 DIAGNOSIS — Z13.1 DIABETES MELLITUS SCREENING: Primary | ICD-10-CM

## 2024-07-28 DIAGNOSIS — D75.1 POLYCYTHEMIA: ICD-10-CM

## 2024-07-28 LAB
FERRITIN SERPL-MCNC: 53.3 NG/ML (ref 13–150)
IRON 24H UR-MRATE: 36 MCG/DL (ref 37–145)
IRON SATN MFR SERPL: 10 % (ref 20–50)
TIBC SERPL-MCNC: 370 MCG/DL (ref 298–536)
TRANSFERRIN SERPL-MCNC: 248 MG/DL (ref 200–360)

## 2024-07-29 ENCOUNTER — TELEPHONE (OUTPATIENT)
Dept: FAMILY MEDICINE CLINIC | Facility: CLINIC | Age: 54
End: 2024-07-29
Payer: COMMERCIAL

## 2024-07-29 RX ORDER — ERGOCALCIFEROL 1.25 MG/1
50000 CAPSULE ORAL WEEKLY
Qty: 12 CAPSULE | Refills: 1 | Status: SHIPPED | OUTPATIENT
Start: 2024-07-29

## 2024-07-30 ENCOUNTER — LAB (OUTPATIENT)
Dept: FAMILY MEDICINE CLINIC | Facility: CLINIC | Age: 54
End: 2024-07-30
Payer: COMMERCIAL

## 2024-07-30 LAB — HBA1C MFR BLD: 6.7 % (ref 4.8–5.6)

## 2024-07-30 PROCEDURE — 83036 HEMOGLOBIN GLYCOSYLATED A1C: CPT | Performed by: FAMILY MEDICINE

## 2024-08-01 ENCOUNTER — OFFICE VISIT (OUTPATIENT)
Dept: FAMILY MEDICINE CLINIC | Facility: CLINIC | Age: 54
End: 2024-08-01
Payer: COMMERCIAL

## 2024-08-01 VITALS
HEART RATE: 86 BPM | OXYGEN SATURATION: 97 % | WEIGHT: 221 LBS | DIASTOLIC BLOOD PRESSURE: 67 MMHG | SYSTOLIC BLOOD PRESSURE: 92 MMHG | HEIGHT: 67 IN | RESPIRATION RATE: 16 BRPM | TEMPERATURE: 97.3 F | BODY MASS INDEX: 34.69 KG/M2

## 2024-08-01 DIAGNOSIS — F17.200 TOBACCO DEPENDENCE: ICD-10-CM

## 2024-08-01 DIAGNOSIS — E11.9 TYPE 2 DIABETES MELLITUS WITHOUT COMPLICATION, WITHOUT LONG-TERM CURRENT USE OF INSULIN: Primary | ICD-10-CM

## 2024-08-01 PROCEDURE — 99214 OFFICE O/P EST MOD 30 MIN: CPT | Performed by: FAMILY MEDICINE

## 2024-08-01 RX ORDER — VARENICLINE TARTRATE 0.5 (11)-1
1 KIT ORAL TAKE AS DIRECTED
Qty: 53 EACH | Refills: 0 | Status: SHIPPED | OUTPATIENT
Start: 2024-08-01

## 2024-08-01 NOTE — PROGRESS NOTES
Subjective   Chief Complaint   Patient presents with    Discuss recent blood work    Diabetes     Newly diagnosed     Claire Moran is a 54 y.o. female.     Patient Care Team:  Ophelia Benson MD as PCP - General (Family Medicine)  Quinn Allen MD as Consulting Physician (Nephrology)  Aneta Marcos MD (Otolaryngology)  Lico Jack MD as Consulting Physician (Urology)    History of Present Illness  She is coming in today to discuss her recent blood work which showed elevated blood sugar and hemoglobin A1c consistent with a new diagnosis diabetes.  Patient reports that her diet is not very good, she really does not get hungry and she typically eats only a small meal once a day and she has been like this for a long time, even with that she however has troubles to lose weight.  On further questioning she tells me that she is a big soda drinker and she drinks about 3 regular sodas a day.  In the past she also drank a lot of sweet tea, but she stopped this as per recommendation of her urologist due to prior history of kidney stones.  She also is a smoker and smokes about 1 pack a day.         The following portions of the patient's history were reviewed and updated as appropriate: allergies, current medications, past family history, past medical history, past social history, past surgical history, and problem list.  Past Medical History:   Diagnosis Date    Allergic rhinitis     Anemia     Diabetes mellitus     Kidney stone     Psoriasis     RTA (renal tubular acidosis)      Past Surgical History:   Procedure Laterality Date    DILATATION AND CURETTAGE      FOOT SURGERY      PAROTID BIOPSY/TUMOR EXCISION  04/2021    [place]in tumor     The patient has a family history of  Family History   Adopted: Yes   Family history unknown: Yes     Social History     Socioeconomic History    Marital status:     Number of children: 3   Tobacco Use    Smoking status: Every Day     Current packs/day: 0.50     Average  "packs/day: 0.5 packs/day for 35.0 years (17.5 ttl pk-yrs)     Types: Cigarettes     Passive exposure: Current    Smokeless tobacco: Never   Vaping Use    Vaping status: Never Used   Substance and Sexual Activity    Alcohol use: Yes    Drug use: Never    Sexual activity: Defer       Review of Systems   Endocrine: Negative for cold intolerance, heat intolerance, polydipsia and polyphagia.     Visit Vitals  BP 92/67 (BP Location: Left arm, Patient Position: Sitting, Cuff Size: Adult)   Pulse 86   Temp 97.3 °F (36.3 °C) (Infrared)   Resp 16   Ht 170.2 cm (67.01\")   Wt 100 kg (221 lb)   SpO2 97%   BMI 34.61 kg/m²       BMI is >= 30 and <35. (Class 1 Obesity). The following options were offered after discussion;: exercise counseling/recommendations      Current Outpatient Medications:     allopurinol (ZYLOPRIM) 300 MG tablet, Take 1 tablet by mouth Daily., Disp: , Rfl:     potassium chloride (KLOR-CON) 20 MEQ CR tablet, KLOR-CON M20 20 MEQ CR-TABS, Disp: , Rfl:     Potassium Citrate ER 15 MEQ (1620 MG) tablet controlled-release, Take 1 tablet by mouth 2 (Two) Times a Day., Disp: , Rfl:     vitamin D (ERGOCALCIFEROL) 1.25 MG (73587 UT) capsule capsule, Take 1 capsule by mouth 1 (One) Time Per Week., Disp: 12 capsule, Rfl: 1    Varenicline Tartrate, Starter, (Chantix Starting Month Pak) 0.5 MG X 11 & 1 MG X 42 tablet therapy pack, Take 1 tablet by mouth Take As Directed., Disp: 53 each, Rfl: 0    Objective   Physical Exam  Constitutional:       General: She is not in acute distress.     Appearance: Normal appearance. She is well-developed. She is not ill-appearing or diaphoretic.      Comments: Patient is in no distress, patient has normal voice and speech.  Normal respiratory effort.   HENT:      Head: Normocephalic and atraumatic.   Pulmonary:      Effort: Pulmonary effort is normal.   Musculoskeletal:      Cervical back: Normal range of motion and neck supple.   Neurological:      General: No focal deficit present.      " Mental Status: She is alert and oriented to person, place, and time. Mental status is at baseline.   Psychiatric:         Mood and Affect: Mood normal.         FOLLOWING LABS WERE REVIEWED TODAY:  CMP          7/17/2024    08:52 7/27/2024    09:36   CMP   Glucose 170  131    BUN 13  11    Creatinine 1.06  1.00    EGFR 62.9  67.5    Sodium 138  136    Potassium 4.0  4.1    Chloride 103  104    Calcium 9.1  9.1    Total Protein  6.7    Albumin 3.8  3.6    Globulin  3.1    Total Bilirubin  0.3    Alkaline Phosphatase  155    AST (SGOT)  32    ALT (SGPT)  29    Albumin/Globulin Ratio  1.2    BUN/Creatinine Ratio 12.3  11.0    Anion Gap 11.8  10.1      Most Recent A1C          7/30/2024    08:54   HGBA1C Most Recent   Hemoglobin A1C 6.70            Assessment & Plan   Diagnoses and all orders for this visit:    1. Type 2 diabetes mellitus without complication, without long-term current use of insulin (Primary)  -     Ambulatory Referral to Endocrinology  -     Comprehensive Metabolic Panel  -     Hemoglobin A1c  -     Lipid Panel  -     Microalbumin / Creatinine Urine Ratio - Urine, Clean Catch    2. Tobacco dependence  -     Varenicline Tartrate, Starter, (Chantix Starting Month Pak) 0.5 MG X 11 & 1 MG X 42 tablet therapy pack; Take 1 tablet by mouth Take As Directed.  Dispense: 53 each; Refill: 0      I reviewed and discussed with the patient her recent blood work results.  Her hemoglobin A1c is elevated at 6.7 consistent with new onset of diabetes.  Healthy lifestyle and diet were discussed and reinforced.  I will be referring her for a diabetes education classes.  I also discussed with the patient possibly starting the medication, but patient prefers to hold off on this for now and rather work on diet.  I think this is reasonable as long as we monitor closely and I patient was advised to follow-up with repeat blood work in 3 months.  The patient was counseled on cutting back or completely eliminating her regular  sodas.  We also talked about her smoking and smoking cessation was strongly encouraged.  I gave her prescription for Chantix to try.      Return in about 3 months (around 11/1/2024) for Next scheduled follow up.    Requested Prescriptions     Signed Prescriptions Disp Refills    Varenicline Tartrate, Starter, (Chantix Starting Month Pak) 0.5 MG X 11 & 1 MG X 42 tablet therapy pack 53 each 0     Sig: Take 1 tablet by mouth Take As Directed.       Ophelia Benson MD  08/01/2024

## 2024-08-05 ENCOUNTER — HOSPITAL ENCOUNTER (OUTPATIENT)
Dept: MAMMOGRAPHY | Facility: HOSPITAL | Age: 54
Discharge: HOME | End: 2024-08-05
Admitting: FAMILY MEDICINE
Payer: COMMERCIAL

## 2024-08-05 DIAGNOSIS — Z12.31 VISIT FOR SCREENING MAMMOGRAM: ICD-10-CM

## 2024-08-05 PROCEDURE — 77067 SCR MAMMO BI INCL CAD: CPT

## 2024-08-05 PROCEDURE — 77063 BREAST TOMOSYNTHESIS BI: CPT

## 2024-09-05 ENCOUNTER — OFFICE VISIT (OUTPATIENT)
Dept: ENDOCRINOLOGY | Facility: CLINIC | Age: 54
End: 2024-09-05
Payer: COMMERCIAL

## 2024-09-05 DIAGNOSIS — E11.9 TYPE 2 DIABETES MELLITUS WITHOUT COMPLICATION, WITHOUT LONG-TERM CURRENT USE OF INSULIN: Primary | ICD-10-CM

## 2024-09-06 NOTE — PROGRESS NOTES
Patient seen for an initial individual assessment appointment.  Patient here for 1 hour and 16 minutes from 11:15 To 12:26   Discussed action of diabetes and dx criteria.  Discussed importance of inspecting the feet daily. Discussed the one plate method with patient.     Patient is currently using unknown Meter.  Patient was able to correctly perform a blood sugar check using a fingerstick using the demo meter.  Blood sugar 107.      Pt to request rx for BG meter from PCP. Pt states she is needs potassium rich foods. Emailed potassium food list to pt.      Patient scheduled for Class 1 on Sept 17

## 2024-09-17 ENCOUNTER — OFFICE VISIT (OUTPATIENT)
Dept: ENDOCRINOLOGY | Facility: CLINIC | Age: 54
End: 2024-09-17
Payer: COMMERCIAL

## 2024-09-17 DIAGNOSIS — E11.9 TYPE 2 DIABETES MELLITUS WITHOUT COMPLICATION, WITHOUT LONG-TERM CURRENT USE OF INSULIN: Primary | ICD-10-CM

## 2024-09-23 ENCOUNTER — OFFICE VISIT (OUTPATIENT)
Dept: ENDOCRINOLOGY | Facility: CLINIC | Age: 54
End: 2024-09-23
Payer: COMMERCIAL

## 2024-09-23 DIAGNOSIS — E11.9 TYPE 2 DIABETES MELLITUS WITHOUT COMPLICATION, WITHOUT LONG-TERM CURRENT USE OF INSULIN: Primary | ICD-10-CM

## 2024-09-23 PROCEDURE — G0109 DIAB MANAGE TRN IND/GROUP: HCPCS

## 2024-09-24 ENCOUNTER — TELEPHONE (OUTPATIENT)
Dept: ENDOCRINOLOGY | Facility: CLINIC | Age: 54
End: 2024-09-24
Payer: COMMERCIAL

## 2025-01-02 ENCOUNTER — OFFICE VISIT (OUTPATIENT)
Dept: FAMILY MEDICINE CLINIC | Facility: CLINIC | Age: 55
End: 2025-01-02
Payer: COMMERCIAL

## 2025-01-02 ENCOUNTER — LAB (OUTPATIENT)
Dept: FAMILY MEDICINE CLINIC | Facility: CLINIC | Age: 55
End: 2025-01-02
Payer: COMMERCIAL

## 2025-01-02 VITALS
SYSTOLIC BLOOD PRESSURE: 104 MMHG | TEMPERATURE: 97.7 F | OXYGEN SATURATION: 91 % | HEIGHT: 67 IN | RESPIRATION RATE: 14 BRPM | WEIGHT: 221.3 LBS | DIASTOLIC BLOOD PRESSURE: 73 MMHG | BODY MASS INDEX: 34.73 KG/M2 | HEART RATE: 95 BPM

## 2025-01-02 DIAGNOSIS — E55.9 VITAMIN D DEFICIENCY: ICD-10-CM

## 2025-01-02 DIAGNOSIS — F17.200 TOBACCO USE DISORDER: ICD-10-CM

## 2025-01-02 DIAGNOSIS — E11.42 TYPE 2 DIABETES MELLITUS WITH DIABETIC POLYNEUROPATHY, WITHOUT LONG-TERM CURRENT USE OF INSULIN: Primary | ICD-10-CM

## 2025-01-02 DIAGNOSIS — R05.2 SUBACUTE COUGH: ICD-10-CM

## 2025-01-02 PROBLEM — R19.7 DIARRHEA: Status: RESOLVED | Noted: 2021-05-25 | Resolved: 2025-01-02

## 2025-01-02 LAB
25(OH)D3 SERPL-MCNC: 39.7 NG/ML (ref 30–100)
ALBUMIN SERPL-MCNC: 3.9 G/DL (ref 3.5–5.2)
ALBUMIN UR-MCNC: 2.8 MG/DL
ALBUMIN/GLOB SERPL: 1.2 G/DL
ALP SERPL-CCNC: 167 U/L (ref 39–117)
ALT SERPL W P-5'-P-CCNC: 37 U/L (ref 1–33)
ANION GAP SERPL CALCULATED.3IONS-SCNC: 9.6 MMOL/L (ref 5–15)
AST SERPL-CCNC: 29 U/L (ref 1–32)
BILIRUB SERPL-MCNC: 0.4 MG/DL (ref 0–1.2)
BUN SERPL-MCNC: 20 MG/DL (ref 6–20)
BUN/CREAT SERPL: 17.9 (ref 7–25)
CALCIUM SPEC-SCNC: 9.6 MG/DL (ref 8.6–10.5)
CHLORIDE SERPL-SCNC: 103 MMOL/L (ref 98–107)
CHOLEST SERPL-MCNC: 188 MG/DL (ref 0–200)
CO2 SERPL-SCNC: 26.4 MMOL/L (ref 22–29)
CREAT SERPL-MCNC: 1.12 MG/DL (ref 0.57–1)
CREAT UR-MCNC: 114.5 MG/DL
EGFRCR SERPLBLD CKD-EPI 2021: 58.9 ML/MIN/1.73
GLOBULIN UR ELPH-MCNC: 3.3 GM/DL
GLUCOSE SERPL-MCNC: 189 MG/DL (ref 65–99)
HBA1C MFR BLD: 6.3 % (ref 4.8–5.6)
HDLC SERPL-MCNC: 37 MG/DL (ref 40–60)
LDLC SERPL CALC-MCNC: 124 MG/DL (ref 0–100)
LDLC/HDLC SERPL: 3.26 {RATIO}
MICROALBUMIN/CREAT UR: 24.5 MG/G (ref 0–29)
POTASSIUM SERPL-SCNC: 4 MMOL/L (ref 3.5–5.2)
PROT SERPL-MCNC: 7.2 G/DL (ref 6–8.5)
SODIUM SERPL-SCNC: 139 MMOL/L (ref 136–145)
TRIGL SERPL-MCNC: 152 MG/DL (ref 0–150)
VLDLC SERPL-MCNC: 27 MG/DL (ref 5–40)

## 2025-01-02 PROCEDURE — 80053 COMPREHEN METABOLIC PANEL: CPT | Performed by: FAMILY MEDICINE

## 2025-01-02 PROCEDURE — 83036 HEMOGLOBIN GLYCOSYLATED A1C: CPT | Performed by: FAMILY MEDICINE

## 2025-01-02 PROCEDURE — 80061 LIPID PANEL: CPT | Performed by: FAMILY MEDICINE

## 2025-01-02 PROCEDURE — 36415 COLL VENOUS BLD VENIPUNCTURE: CPT | Performed by: FAMILY MEDICINE

## 2025-01-02 PROCEDURE — 99214 OFFICE O/P EST MOD 30 MIN: CPT | Performed by: FAMILY MEDICINE

## 2025-01-02 PROCEDURE — 82570 ASSAY OF URINE CREATININE: CPT | Performed by: FAMILY MEDICINE

## 2025-01-02 PROCEDURE — 82043 UR ALBUMIN QUANTITATIVE: CPT | Performed by: FAMILY MEDICINE

## 2025-01-02 PROCEDURE — 82306 VITAMIN D 25 HYDROXY: CPT | Performed by: FAMILY MEDICINE

## 2025-01-02 RX ORDER — LANCING DEVICE/LANCETS
1 KIT MISCELLANEOUS DAILY
Qty: 1 KIT | Refills: 0 | Status: SHIPPED | OUTPATIENT
Start: 2025-01-02

## 2025-01-02 RX ORDER — VARENICLINE TARTRATE 0.5 (11)-1
1 KIT ORAL TAKE AS DIRECTED
Qty: 53 EACH | Refills: 0 | Status: SHIPPED | OUTPATIENT
Start: 2025-01-02

## 2025-01-02 RX ORDER — BLOOD-GLUCOSE METER
1 EACH MISCELLANEOUS DAILY
Qty: 1 KIT | Refills: 0 | Status: SHIPPED | OUTPATIENT
Start: 2025-01-02

## 2025-01-02 RX ORDER — LANCETS
1 EACH MISCELLANEOUS DAILY
Qty: 100 EACH | Refills: 12 | Status: SHIPPED | OUTPATIENT
Start: 2025-01-02

## 2025-01-02 NOTE — PROGRESS NOTES
Subjective   Chief Complaint   Patient presents with    Diabetes    Follow-up     Claire Moran is a 54 y.o. female.     Patient Care Team:  Ophelia Benson MD as PCP - General (Family Medicine)  Quinn Allen MD as Consulting Physician (Nephrology)  Aneta Marcos MD (Otolaryngology)  Lico Jack MD as Consulting Physician (Urology)    History of Present Illness  She is coming in today to follow-up on her newly diagnosed diabetes.  Her hemoglobin A1c was elevated at 6.7 in 7/2024, patient was then referred for diabetes education classes which she completed.  She does not have a functioning glucometer at home.  Patient reports that over the last few months she has been noticing some numbness affecting her both great toes, it comes and goes and she wonders if this is diabetes related.  She also tells me that over the last 3 weeks she has been experiencing some cough, she has noted pain in the back of her neck into the head with a cough spells, it last for few seconds.  Otherwise no headaches or neck pain reported.       The following portions of the patient's history were reviewed and updated as appropriate: allergies, current medications, past family history, past medical history, past social history, past surgical history, and problem list.  Past Medical History:   Diagnosis Date    Allergic rhinitis     Anemia     Cataract     Diabetes mellitus     Kidney stone     Psoriasis     Renal insufficiency     RTA (renal tubular acidosis)      Past Surgical History:   Procedure Laterality Date    COLONOSCOPY      DILATATION AND CURETTAGE      EYE SURGERY  2019    Cataracts    FOOT SURGERY      PAROTID BIOPSY/TUMOR EXCISION  04/2021    [place] tumor    SINUS SURGERY       The patient has a family history of  Family History   Adopted: Yes   Family history unknown: Yes     Social History     Socioeconomic History    Marital status:     Number of children: 3   Tobacco Use    Smoking status: Every Day      "Current packs/day: 0.50     Average packs/day: 0.5 packs/day for 35.0 years (17.5 ttl pk-yrs)     Types: Cigarettes     Passive exposure: Current    Smokeless tobacco: Never   Vaping Use    Vaping status: Never Used   Substance and Sexual Activity    Alcohol use: Yes    Drug use: Never    Sexual activity: Not Currently     Partners: Male     Birth control/protection: Condom       Review of Systems   Constitutional:  Negative for activity change.   Respiratory:  Positive for cough. Negative for shortness of breath and wheezing.    Cardiovascular:  Negative for palpitations and leg swelling.   Musculoskeletal:  Negative for arthralgias and back pain.   Neurological:  Positive for numbness. Negative for headache.     Visit Vitals  /73 (BP Location: Left arm, Patient Position: Sitting, Cuff Size: Adult)   Pulse 95   Temp 97.7 °F (36.5 °C) (Infrared)   Resp 14   Ht 170.2 cm (67.01\")   Wt 100 kg (221 lb 4.8 oz)   SpO2 91%   BMI 34.65 kg/m²              Current Outpatient Medications:     allopurinol (ZYLOPRIM) 300 MG tablet, Take 1 tablet by mouth Daily., Disp: , Rfl:     potassium chloride (KLOR-CON) 20 MEQ CR tablet, KLOR-CON M20 20 MEQ CR-TABS, Disp: , Rfl:     Potassium Citrate ER 15 MEQ (1620 MG) tablet controlled-release, Take 1 tablet by mouth 2 (Two) Times a Day., Disp: , Rfl:     Varenicline Tartrate, Starter, (Chantix Starting Month Pak) 0.5 MG X 11 & 1 MG X 42 tablet therapy pack, Take 1 tablet by mouth Take As Directed., Disp: 53 each, Rfl: 0    vitamin D (ERGOCALCIFEROL) 1.25 MG (34014 UT) capsule capsule, Take 1 capsule by mouth 1 (One) Time Per Week., Disp: 12 capsule, Rfl: 1    Accu-Chek Softclix Lancets lancets, 1 each by Other route Daily., Disp: 100 each, Rfl: 12    Blood Glucose Monitoring Suppl (Accu-Chek Guide) w/Device kit, Use 1 Device Daily., Disp: 1 kit, Rfl: 0    Lancets Misc. (Accu-Chek Softclix Lancet Dev) kit, Use 1 each Daily. Dg: E11.9, Disp: 1 kit, Rfl: 0    Objective   Physical " Exam  Vitals and nursing note reviewed.   Constitutional:       General: She is not in acute distress.     Appearance: Normal appearance. She is well-developed. She is not ill-appearing or diaphoretic.      Comments: Patient is in no distress, patient has normal voice and speech.  Normal respiratory effort.   HENT:      Head: Normocephalic and atraumatic.   Neck:      Vascular: No carotid bruit.   Cardiovascular:      Rate and Rhythm: Normal rate and regular rhythm.      Heart sounds: Normal heart sounds. No murmur heard.     No gallop.   Pulmonary:      Effort: Pulmonary effort is normal. No respiratory distress.      Breath sounds: Normal breath sounds. No wheezing, rhonchi or rales.   Chest:      Chest wall: No tenderness.   Musculoskeletal:      Cervical back: Normal range of motion and neck supple. No rigidity or tenderness.   Lymphadenopathy:      Cervical: No cervical adenopathy.   Neurological:      General: No focal deficit present.      Mental Status: She is alert and oriented to person, place, and time. Mental status is at baseline.   Psychiatric:         Mood and Affect: Mood normal.         FOLLOWING LABS WERE REVIEWED TODAY:  CMP          7/17/2024    08:52 7/27/2024    09:36   CMP   Glucose 170  131    BUN 13  11    Creatinine 1.06  1.00    EGFR 62.9  67.5    Sodium 138  136    Potassium 4.0  4.1    Chloride 103  104    Calcium 9.1  9.1    Total Protein  6.7    Albumin 3.8  3.6    Globulin  3.1    Total Bilirubin  0.3    Alkaline Phosphatase  155    AST (SGOT)  32    ALT (SGPT)  29    Albumin/Globulin Ratio  1.2    BUN/Creatinine Ratio 12.3  11.0    Anion Gap 11.8  10.1      Lipid Panel          7/27/2024    09:36   Lipid Panel   Total Cholesterol 163    Triglycerides 144    HDL Cholesterol 35    VLDL Cholesterol 26    LDL Cholesterol  102    LDL/HDL Ratio 2.83      TSH          7/27/2024    09:36   TSH   TSH 1.390      Most Recent A1C          7/30/2024    08:54   HGBA1C Most Recent   Hemoglobin A1C  6.70          Assessment & Plan   Diagnoses and all orders for this visit:    1. Type 2 diabetes mellitus with diabetic polyneuropathy, without long-term current use of insulin (Primary)  -     Cancel: Comprehensive Metabolic Panel  -     Cancel: Hemoglobin A1c  -     Cancel: Lipid Panel  -     Cancel: Microalbumin / Creatinine Urine Ratio - Urine, Clean Catch  -     Blood Glucose Monitoring Suppl (Accu-Chek Guide) w/Device kit; Use 1 Device Daily.  Dispense: 1 kit; Refill: 0  -     Lancets Misc. (Accu-Chek Softclix Lancet Dev) kit; Use 1 each Daily. Dg: E11.9  Dispense: 1 kit; Refill: 0  -     Accu-Chek Softclix Lancets lancets; 1 each by Other route Daily.  Dispense: 100 each; Refill: 12    2. Vitamin D deficiency  -     Vitamin D,25-Hydroxy    3. Tobacco use disorder  -     Varenicline Tartrate, Starter, (Chantix Starting Month Pak) 0.5 MG X 11 & 1 MG X 42 tablet therapy pack; Take 1 tablet by mouth Take As Directed.  Dispense: 53 each; Refill: 0    4. Subacute cough        I reviewed her previous labs and those were discussed with the patient's today.  I will be rechecking fasting labs today as well.  Healthy lifestyle was reinforced.  We also discussed her neuropathy symptoms and footcare was discussed.  Smoking cessation was also discussed and recommended, patient states that she now feels ready to work on it and prescription for Chantix was given.  We also discussed her subacute cough with some cough spells triggering neck pain and headache.  Patient will continue to monitor and contact us back if no improvement within the next few weeks.      Return in about 3 months (around 4/2/2025) for Next scheduled follow up.    Requested Prescriptions     Signed Prescriptions Disp Refills    Blood Glucose Monitoring Suppl (Accu-Chek Guide) w/Device kit 1 kit 0     Sig: Use 1 Device Daily.    Lancets Misc. (Accu-Chek Softclix Lancet Dev) kit 1 kit 0     Sig: Use 1 each Daily. Dg: E11.9    Accu-Chek Softclix Lancets  lancets 100 each 12     Si each by Other route Daily.    Varenicline Tartrate, Starter, (Chantix Starting Month Santi) 0.5 MG X 11 & 1 MG X 42 tablet therapy pack 53 each 0     Sig: Take 1 tablet by mouth Take As Directed.       Ophelia Benson MD  2025  08:25 EST

## 2025-01-08 DIAGNOSIS — F17.200 TOBACCO USE DISORDER: ICD-10-CM

## 2025-01-08 RX ORDER — VARENICLINE TARTRATE 0.5 (11)-1
1 KIT ORAL TAKE AS DIRECTED
Qty: 53 EACH | Refills: 0 | Status: SHIPPED | OUTPATIENT
Start: 2025-01-08

## 2025-01-08 RX ORDER — BLOOD SUGAR DIAGNOSTIC
1 STRIP MISCELLANEOUS DAILY
Qty: 100 EACH | Refills: 0 | Status: SHIPPED | OUTPATIENT
Start: 2025-01-08 | End: 2025-01-10

## 2025-01-10 RX ORDER — BLOOD SUGAR DIAGNOSTIC
STRIP MISCELLANEOUS DAILY
Qty: 100 EACH | Refills: 1 | Status: SHIPPED | OUTPATIENT
Start: 2025-01-10

## 2025-01-24 RX ORDER — ERGOCALCIFEROL 1.25 MG/1
50000 CAPSULE, LIQUID FILLED ORAL WEEKLY
Qty: 12 CAPSULE | Refills: 1 | Status: SHIPPED | OUTPATIENT
Start: 2025-01-24

## 2025-04-24 RX ORDER — ERGOCALCIFEROL 1.25 MG/1
50000 CAPSULE, LIQUID FILLED ORAL WEEKLY
Qty: 12 CAPSULE | Refills: 0 | Status: SHIPPED | OUTPATIENT
Start: 2025-04-24

## 2025-08-13 ENCOUNTER — LAB (OUTPATIENT)
Dept: LAB | Facility: HOSPITAL | Age: 55
End: 2025-08-13
Payer: COMMERCIAL

## 2025-08-13 ENCOUNTER — TRANSCRIBE ORDERS (OUTPATIENT)
Dept: ADMINISTRATIVE | Facility: HOSPITAL | Age: 55
End: 2025-08-13
Payer: COMMERCIAL

## 2025-08-13 DIAGNOSIS — N20.0 URIC ACID NEPHROLITHIASIS: ICD-10-CM

## 2025-08-13 DIAGNOSIS — E79.0 URICACIDEMIA: ICD-10-CM

## 2025-08-13 DIAGNOSIS — E87.6 HYPOPOTASSEMIA: ICD-10-CM

## 2025-08-13 DIAGNOSIS — N18.2 CHRONIC KIDNEY DISEASE, STAGE II (MILD): ICD-10-CM

## 2025-08-13 DIAGNOSIS — N18.2 CHRONIC KIDNEY DISEASE, STAGE II (MILD): Primary | ICD-10-CM

## 2025-08-13 LAB
ALBUMIN SERPL-MCNC: 3.9 G/DL (ref 3.5–5.2)
ALBUMIN UR-MCNC: 3 MG/DL
ANION GAP SERPL CALCULATED.3IONS-SCNC: 11.2 MMOL/L (ref 5–15)
BACTERIA UR QL AUTO: ABNORMAL /HPF
BILIRUB UR QL STRIP: NEGATIVE
BUN SERPL-MCNC: 12.5 MG/DL (ref 6–20)
BUN/CREAT SERPL: 11.5 (ref 7–25)
CALCIUM SPEC-SCNC: 9.7 MG/DL (ref 8.6–10.5)
CHLORIDE SERPL-SCNC: 103 MMOL/L (ref 98–107)
CLARITY UR: ABNORMAL
CO2 SERPL-SCNC: 24.8 MMOL/L (ref 22–29)
COLOR UR: YELLOW
CREAT SERPL-MCNC: 1.09 MG/DL (ref 0.57–1)
CREAT UR-MCNC: 89.6 MG/DL
EGFRCR SERPLBLD CKD-EPI 2021: 60.5 ML/MIN/1.73
GLUCOSE SERPL-MCNC: 157 MG/DL (ref 65–99)
GLUCOSE UR STRIP-MCNC: NEGATIVE MG/DL
HGB UR QL STRIP.AUTO: NEGATIVE
HOLD SPECIMEN: NORMAL
HYALINE CASTS UR QL AUTO: ABNORMAL /LPF
KETONES UR QL STRIP: NEGATIVE
LEUKOCYTE ESTERASE UR QL STRIP.AUTO: ABNORMAL
MICROALBUMIN/CREAT UR: 33.5 MG/G (ref 0–29)
NITRITE UR QL STRIP: NEGATIVE
PH UR STRIP.AUTO: 6.5 [PH] (ref 5–8)
PHOSPHATE SERPL-MCNC: 3.3 MG/DL (ref 2.5–4.5)
POTASSIUM SERPL-SCNC: 3.5 MMOL/L (ref 3.5–5.2)
PROT UR QL STRIP: ABNORMAL
RBC # UR STRIP: ABNORMAL /HPF
REF LAB TEST METHOD: ABNORMAL
SODIUM SERPL-SCNC: 139 MMOL/L (ref 136–145)
SP GR UR STRIP: 1.01 (ref 1–1.03)
SQUAMOUS #/AREA URNS HPF: ABNORMAL /HPF
URATE SERPL-MCNC: 4.4 MG/DL (ref 2.4–5.7)
UROBILINOGEN UR QL STRIP: ABNORMAL
WBC # UR STRIP: ABNORMAL /HPF

## 2025-08-13 PROCEDURE — 81001 URINALYSIS AUTO W/SCOPE: CPT

## 2025-08-13 PROCEDURE — 87086 URINE CULTURE/COLONY COUNT: CPT

## 2025-08-13 PROCEDURE — 82043 UR ALBUMIN QUANTITATIVE: CPT

## 2025-08-13 PROCEDURE — 84550 ASSAY OF BLOOD/URIC ACID: CPT

## 2025-08-13 PROCEDURE — 80069 RENAL FUNCTION PANEL: CPT

## 2025-08-13 PROCEDURE — 36415 COLL VENOUS BLD VENIPUNCTURE: CPT

## 2025-08-13 PROCEDURE — 82570 ASSAY OF URINE CREATININE: CPT

## 2025-08-15 LAB — BACTERIA SPEC AEROBE CULT: NORMAL
